# Patient Record
Sex: MALE | Race: WHITE | NOT HISPANIC OR LATINO | URBAN - METROPOLITAN AREA
[De-identification: names, ages, dates, MRNs, and addresses within clinical notes are randomized per-mention and may not be internally consistent; named-entity substitution may affect disease eponyms.]

---

## 2017-06-13 PROBLEM — Z00.00 ENCOUNTER FOR PREVENTIVE HEALTH EXAMINATION: Status: ACTIVE | Noted: 2017-06-13

## 2021-08-25 ENCOUNTER — EMERGENCY (EMERGENCY)
Facility: HOSPITAL | Age: 61
LOS: 1 days | Discharge: HOME | End: 2021-08-25
Attending: EMERGENCY MEDICINE | Admitting: EMERGENCY MEDICINE
Payer: COMMERCIAL

## 2021-08-25 VITALS
OXYGEN SATURATION: 100 % | HEART RATE: 59 BPM | RESPIRATION RATE: 18 BRPM | SYSTOLIC BLOOD PRESSURE: 168 MMHG | TEMPERATURE: 98 F | DIASTOLIC BLOOD PRESSURE: 80 MMHG

## 2021-08-25 DIAGNOSIS — F33.9 MAJOR DEPRESSIVE DISORDER, RECURRENT, UNSPECIFIED: ICD-10-CM

## 2021-08-25 LAB
ALBUMIN SERPL ELPH-MCNC: 4.9 G/DL — SIGNIFICANT CHANGE UP (ref 3.3–5)
ALP SERPL-CCNC: 96 U/L — SIGNIFICANT CHANGE UP (ref 40–120)
ALT FLD-CCNC: 24 U/L — SIGNIFICANT CHANGE UP (ref 4–41)
AMPHET UR-MCNC: NEGATIVE — SIGNIFICANT CHANGE UP
ANION GAP SERPL CALC-SCNC: 14 MMOL/L — SIGNIFICANT CHANGE UP (ref 7–14)
APAP SERPL-MCNC: <15 UG/ML — SIGNIFICANT CHANGE UP (ref 15–25)
APPEARANCE UR: CLEAR — SIGNIFICANT CHANGE UP
AST SERPL-CCNC: 18 U/L — SIGNIFICANT CHANGE UP (ref 4–40)
BARBITURATES UR SCN-MCNC: NEGATIVE — SIGNIFICANT CHANGE UP
BASOPHILS # BLD AUTO: 0.03 K/UL — SIGNIFICANT CHANGE UP (ref 0–0.2)
BASOPHILS NFR BLD AUTO: 0.4 % — SIGNIFICANT CHANGE UP (ref 0–2)
BENZODIAZ UR-MCNC: NEGATIVE — SIGNIFICANT CHANGE UP
BILIRUB SERPL-MCNC: 0.5 MG/DL — SIGNIFICANT CHANGE UP (ref 0.2–1.2)
BILIRUB UR-MCNC: NEGATIVE — SIGNIFICANT CHANGE UP
BUN SERPL-MCNC: 18 MG/DL — SIGNIFICANT CHANGE UP (ref 7–23)
CALCIUM SERPL-MCNC: 9.9 MG/DL — SIGNIFICANT CHANGE UP (ref 8.4–10.5)
CHLORIDE SERPL-SCNC: 103 MMOL/L — SIGNIFICANT CHANGE UP (ref 98–107)
CO2 SERPL-SCNC: 23 MMOL/L — SIGNIFICANT CHANGE UP (ref 22–31)
COCAINE METAB.OTHER UR-MCNC: NEGATIVE — SIGNIFICANT CHANGE UP
COLOR SPEC: SIGNIFICANT CHANGE UP
COVID-19 SPIKE DOMAIN AB INTERP: POSITIVE
COVID-19 SPIKE DOMAIN ANTIBODY RESULT: >250 U/ML — HIGH
CREAT SERPL-MCNC: 0.91 MG/DL — SIGNIFICANT CHANGE UP (ref 0.5–1.3)
CREATININE URINE RESULT, DAU: 107 MG/DL — SIGNIFICANT CHANGE UP
DIFF PNL FLD: NEGATIVE — SIGNIFICANT CHANGE UP
EOSINOPHIL # BLD AUTO: 0.09 K/UL — SIGNIFICANT CHANGE UP (ref 0–0.5)
EOSINOPHIL NFR BLD AUTO: 1.2 % — SIGNIFICANT CHANGE UP (ref 0–6)
ETHANOL SERPL-MCNC: <10 MG/DL — SIGNIFICANT CHANGE UP
GLUCOSE SERPL-MCNC: 86 MG/DL — SIGNIFICANT CHANGE UP (ref 70–99)
GLUCOSE UR QL: NEGATIVE — SIGNIFICANT CHANGE UP
HCT VFR BLD CALC: 44 % — SIGNIFICANT CHANGE UP (ref 39–50)
HGB BLD-MCNC: 14.5 G/DL — SIGNIFICANT CHANGE UP (ref 13–17)
IANC: 4.98 K/UL — SIGNIFICANT CHANGE UP (ref 1.5–8.5)
IMM GRANULOCYTES NFR BLD AUTO: 0.3 % — SIGNIFICANT CHANGE UP (ref 0–1.5)
KETONES UR-MCNC: NEGATIVE — SIGNIFICANT CHANGE UP
LEUKOCYTE ESTERASE UR-ACNC: NEGATIVE — SIGNIFICANT CHANGE UP
LYMPHOCYTES # BLD AUTO: 2.05 K/UL — SIGNIFICANT CHANGE UP (ref 1–3.3)
LYMPHOCYTES # BLD AUTO: 26.8 % — SIGNIFICANT CHANGE UP (ref 13–44)
MCHC RBC-ENTMCNC: 27.8 PG — SIGNIFICANT CHANGE UP (ref 27–34)
MCHC RBC-ENTMCNC: 33 GM/DL — SIGNIFICANT CHANGE UP (ref 32–36)
MCV RBC AUTO: 84.5 FL — SIGNIFICANT CHANGE UP (ref 80–100)
METHADONE UR-MCNC: NEGATIVE — SIGNIFICANT CHANGE UP
MONOCYTES # BLD AUTO: 0.49 K/UL — SIGNIFICANT CHANGE UP (ref 0–0.9)
MONOCYTES NFR BLD AUTO: 6.4 % — SIGNIFICANT CHANGE UP (ref 2–14)
NEUTROPHILS # BLD AUTO: 4.98 K/UL — SIGNIFICANT CHANGE UP (ref 1.8–7.4)
NEUTROPHILS NFR BLD AUTO: 64.9 % — SIGNIFICANT CHANGE UP (ref 43–77)
NITRITE UR-MCNC: NEGATIVE — SIGNIFICANT CHANGE UP
NRBC # BLD: 0 /100 WBCS — SIGNIFICANT CHANGE UP
NRBC # FLD: 0 K/UL — SIGNIFICANT CHANGE UP
OPIATES UR-MCNC: NEGATIVE — SIGNIFICANT CHANGE UP
OXYCODONE UR-MCNC: NEGATIVE — SIGNIFICANT CHANGE UP
PCP SPEC-MCNC: SIGNIFICANT CHANGE UP
PCP UR-MCNC: NEGATIVE — SIGNIFICANT CHANGE UP
PH UR: 6 — SIGNIFICANT CHANGE UP (ref 5–8)
PLATELET # BLD AUTO: 330 K/UL — SIGNIFICANT CHANGE UP (ref 150–400)
POTASSIUM SERPL-MCNC: 4.1 MMOL/L — SIGNIFICANT CHANGE UP (ref 3.5–5.3)
POTASSIUM SERPL-SCNC: 4.1 MMOL/L — SIGNIFICANT CHANGE UP (ref 3.5–5.3)
PROT SERPL-MCNC: 7.8 G/DL — SIGNIFICANT CHANGE UP (ref 6–8.3)
PROT UR-MCNC: NEGATIVE — SIGNIFICANT CHANGE UP
RBC # BLD: 5.21 M/UL — SIGNIFICANT CHANGE UP (ref 4.2–5.8)
RBC # FLD: 12.8 % — SIGNIFICANT CHANGE UP (ref 10.3–14.5)
SALICYLATES SERPL-MCNC: <5 MG/DL — LOW (ref 15–30)
SARS-COV-2 IGG+IGM SERPL QL IA: >250 U/ML — HIGH
SARS-COV-2 IGG+IGM SERPL QL IA: POSITIVE
SARS-COV-2 RNA SPEC QL NAA+PROBE: SIGNIFICANT CHANGE UP
SODIUM SERPL-SCNC: 140 MMOL/L — SIGNIFICANT CHANGE UP (ref 135–145)
SP GR SPEC: 1.01 — SIGNIFICANT CHANGE UP (ref 1.01–1.02)
THC UR QL: NEGATIVE — SIGNIFICANT CHANGE UP
TSH SERPL-MCNC: 1.27 UIU/ML — SIGNIFICANT CHANGE UP (ref 0.27–4.2)
UROBILINOGEN FLD QL: SIGNIFICANT CHANGE UP
WBC # BLD: 7.66 K/UL — SIGNIFICANT CHANGE UP (ref 3.8–10.5)
WBC # FLD AUTO: 7.66 K/UL — SIGNIFICANT CHANGE UP (ref 3.8–10.5)

## 2021-08-25 PROCEDURE — 93010 ELECTROCARDIOGRAM REPORT: CPT

## 2021-08-25 PROCEDURE — 90792 PSYCH DIAG EVAL W/MED SRVCS: CPT

## 2021-08-25 PROCEDURE — 99285 EMERGENCY DEPT VISIT HI MDM: CPT | Mod: 25

## 2021-08-25 RX ORDER — HYDROXYZINE HCL 10 MG
50 TABLET ORAL ONCE
Refills: 0 | Status: COMPLETED | OUTPATIENT
Start: 2021-08-25 | End: 2021-08-25

## 2021-08-25 RX ADMIN — Medication 50 MILLIGRAM(S): at 16:27

## 2021-08-25 NOTE — ED BEHAVIORAL HEALTH ASSESSMENT NOTE - HPI (INCLUDE ILLNESS QUALITY, SEVERITY, DURATION, TIMING, CONTEXT, MODIFYING FACTORS, ASSOCIATED SIGNS AND SYMPTOMS)
Pt. is a 61 y.o. 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    Patient brought to hospital by wife per her request after waking up this morning and thinking "I just can't do this anymore". He states that he has been feeling increasingly paranoid and states "I'm afraid something bad will happen. When asked about precipitating events, patient unable to identify a specific event that caused him to come to the hospital, rather citing a week of accumulating paranoia and inability to go to work for a week. Patient believes that his symptoms are related to stopping his Adderall. Patient was prescribed Adderall 5 weeks ago by a psychiatrist on a website called [facility], stating that the psychiatrist (Ander Miller) felt he med criteria for ADHD. After 1 week on Adderall, patient started to feel "spacy" and continued Adderall for an additional 2 weeks, stopping the Adderall 2 weeks prior to present visit. Patient states that paranoia has increased since stopping the medication and he feels that something bad will happen. He has not been able to leave the house or go to work for a week. Patient also endorses a depressed mood, irritability, "fitful" sleep, decreased interest in enjoyed activities (seeing people at AA meetings), feelings of guilt, decreased energy, impaired concentration, and decreased appetite for the past 2 weeks. Patient believes this, along with paranoia, is related to stopping the Adderall. Patient denies active SIIP/HIIP or history of SIIP/HIIP.    In terms of psychotic symptoms, patient reports subjective feeling of paranoia but denies feeling that anyone is out to get him. He denies delusions of thought broadcasting or reference. Patient denies A/V hallucinations.    In terms of manic symptoms, patient denies elevated mood, impulsive decisions, feeling "on top of the world", racing thoughts, increased goal-directed activity, decreased need for sleep, or excess talkativeness. Pt. is a 61 y.o. 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    Patient brought to hospital by wife per her request after waking up this morning and thinking "I just can't do this anymore". He states that he has been feeling increasingly paranoid and states "I'm afraid something bad will happen. When asked about precipitating events, patient unable to identify a specific event that caused him to come to the hospital, rather citing a week of accumulating paranoia and inability to go to work for a week. Patient believes that his symptoms are related to stopping his Adderall. Patient was prescribed Adderall 5 weeks ago by a psychiatrist on a website called [facility], stating that the psychiatrist (Ander Miller) felt he med criteria for ADHD. After 1 week on Adderall, patient started to feel "spacy" and continued Adderall for an additional 2 weeks, stopping the Adderall 2 weeks prior to present visit. Patient states that paranoia has increased since stopping the medication and he feels that something bad will happen. He has not been able to leave the house or go to work for a week. Patient also endorses a depressed mood, irritability, "fitful" sleep, decreased interest in enjoyed activities (seeing people at AA meetings), feelings of guilt, decreased energy, impaired concentration, and decreased appetite for the past 2 weeks. Patient believes this, along with paranoia, is related to stopping the Adderall. Patient denies active SIIP/HIIP or history of SIIP/HIIP.    In terms of psychotic symptoms, patient reports subjective feeling of paranoia but denies feeling that anyone is out to get him. He denies delusions of thought broadcasting or reference. Patient denies A/V hallucinations.    In terms of manic symptoms, patient denies elevated mood, impulsive decisions, feeling "on top of the world", racing thoughts, increased goal-directed activity, decreased need for sleep, or excess talkativeness.    Collateral obtained from patient's wife Pt. is a 61 y.o. 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    Patient brought to hospital by wife per her request after waking up this morning and thinking "I just can't do this anymore". He states "I'm afraid something bad will happen. When asked about precipitating events, patient unable to identify a specific event that caused him to come to the hospital, rather citing a week of accumulating anxiety and inability to go to work for a week. Patient believes that his symptoms are related to stopping his Adderall. Patient was prescribed Adderall 5 weeks ago by a psychiatrist on a website called [employer], stating that the psychiatrist (Adner Miller) felt he med criteria for ADHD. After 1 week on Adderall, patient started to feel "spacy" and continued Adderall for an additional 2 weeks, stopping the Adderall 2 weeks prior to present visit. Patient states that fear that something bad will happen has increased since stopping the medication and he feels that something bad will happen. He has not been able to leave the house or go to work for a week. Patient also endorses a depressed mood, irritability, "fitful" sleep, decreased interest in enjoyed activities (seeing people at AA meetings), feelings of guilt, decreased energy, impaired concentration, and decreased appetite for the past 2 weeks. Patient believes this is related to stopping the Adderall. Patient also endorses general feelings of anxiety, irritability, feelings of restlessness, and muscle tension. Patient denies active SIIP/HIIP or history of SIIP/HIIP.    In terms of psychotic symptoms, patient reports subjective feeling of paranoia but denies feeling that anyone is out to get him. He denies delusions of thought broadcasting or reference. Patient denies A/V hallucinations.    In terms of manic symptoms, patient denies elevated mood, impulsive decisions, feeling "on top of the world", racing thoughts, increased goal-directed activity, decreased need for sleep, or excess talkativeness.    Collateral obtained from patient's wife disclosed that patient has been unable to go to work for the past week. She states that he has been staring off into space and refusing to eat. She believes it was triggered by the family dog getting very ill, but is also concerned that the underlying cause of all of the symptoms is related to his Adderall prescription. Of note, wife states that patient gets sad around September every year, as he went through a custody castano for his daughter in September 2004. Though he won custody, his daughter chose to go live with her mother. His daughter was supposed to visit a month ago, but suddenly cancelled. He has not seen her in four years. Pt. is a 61 y.o. 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    Patient brought to hospital by wife per her request after waking up this morning and thinking "I just can't do this anymore". He states "I'm afraid something bad will happen." When asked about precipitating events, patient unable to identify a specific event that caused him to come to the hospital, rather citing a week of accumulating anxiety and inability to go to work for a week. Patient believes that his symptoms are related to stopping his Adderall 10mg XR. Patient was prescribed Adderall 5 weeks ago by a psychiatrist on a website called [employer], stating that the psychiatrist (Ander Miller) felt he met criteria for ADHD. After 1 week on Adderall, patient started to feel "spacy" and continued Adderall for an additional 2 weeks but ultimately decided to stopping the Adderall 2 weeks prior to present visit. Patient states that he fears that something bad will happen if he leaves the house and he has not gone to work for a week. When asked what that something is "he states that he feels he won't be able to do his job" and denies any associated paranoia (someone will hurt him etc.). . Patient also endorses a depressed mood, irritability, "fitful" sleep, decreased interest in enjoyed activities (seeing people at [employer] meetings), feelings of guilt, decreased energy, impaired concentration, and decreased appetite for the past 2 weeks and patient feels that this is since stopping the Adderall. Patient also endorses general feelings of anxiety, irritability, feelings of restlessness, and muscle tension but denies any panic attacks. Patient denies active SIIP/HIIP or history of SIIP/HIIP. Patient states he's been taking Zoloft 150mg po daily consistently and had been on Abilify but his "[facility]" Doctor increased the Abilify from 5mg to 10mg and states that since that happened he reported higher anxiety so he discontinued the Abilify.    In terms of psychotic symptoms, patient reports subjective feeling of paranoia but denies feeling that anyone is out to get him. He denies delusions of thought broadcasting or reference. Patient denies A/V hallucinations.    In terms of manic symptoms, patient denies elevated mood, impulsive decisions, feeling "on top of the world", racing thoughts, increased goal-directed activity, decreased need for sleep, or excess talkativeness.    Collateral obtained from patient's wife disclosed that patient has been unable to go to work for the past week. She states that he has been staring off into space and refusing to eat. She believes it was triggered by the family dog getting very ill, but is also concerned that the underlying cause of all of the symptoms is related to his Adderall prescription. Of note, wife states that patient gets sad around September every year, as he went through a custody castano for his daughter in September 2004. Though he won custody, his daughter chose to go live with her mother. His daughter was supposed to visit a month ago, but suddenly cancelled. He has not seen her in four years.

## 2021-08-25 NOTE — ED BEHAVIORAL HEALTH ASSESSMENT NOTE - CASE SUMMARY
I have personally seen and examined this patient. I have fully participated in the care of this patient. I have made amendments to the documentation where appropriate and otherwise agree with the history, physical exam, and plan as documented Yovanny Bang.   Patient is presenting with high anxiety that has impaired his sleep and appetite (labs and vitals are stable) with associated depression (feeling hopeless, has not been motivated, unable to go to work) and reporting more fear of failure. At this time he is adamantly denying any SI/I/P, able to engage in safety planning and collateral does not report acute safety concerns. Patient has had multiple psychotropic medication changes including d/c of Adderall XR 10mg over the last two weeks, recent increase of ABilify 10mg po with Zoloft 150mg po. Patient seeking medication adjustments and feels hopeful that [facility] will be able to help him establish stable outpatient care. At this time, patient is declining voluntary psych hospitalization and does not meet criteria for involuntary psych hospitalization.

## 2021-08-25 NOTE — ED PROVIDER NOTE - PROGRESS NOTE DETAILS
Patient received in signout from Dr Chaparro  60 y/o male who PTED with a feeling that he " just can't do this anymore". He states that he has been feeling increasingly paranoia that  " something bad will happen" over the past week temporized to his d/cing his Adderal he obtained via telepsych ( a website called [employer]) to treat supposed ADHD s/s/. He  is feeling depressed and has disturbed sleep, guilt, decreased energy, impaired concentration, and decreased appetite. Symptoms are affecting his ADL's; he cant go to work  VS  T(F): 98.2 HR: 59 BP: 168/80 RR: 18 SpO2: 100%   PE: as described; my additions and exceptions are noted in the chart    DATA:    LAB:                       14.5   7.66  )-----------( 330      ( 25 Aug 2021 15:21 )             44.0   Mean Cell Volume: 84.5 fL (21 @ 15:21) Auto Neutrophil %: 64.9 % (21 @ 15:21) Auto Eosinophil %: 1.2 % (21 @ 15:21)     Urinalysis Basic - ( 25 Aug 2021 15:21 ) Color: Light Yellow / Appearance: Clear / S.013 / pH: x Gluc: x / Ketone: Negative  / Bili: Negative / Urobili: <2 mg/dL  Blood: x / Protein: Negative / Nitrite: Negative  Leuk Esterase: Negative / RBC: x / WBC x  Sq Epi: x / Non Sq Epi: x / Bacteria: x    IMPRESSION/RISK:  Dx=Depression and paranoia  Consideration include  Plan  Patient to be seen by psych  labs wnl  will follow reccs  medically cleared if admission wished/recommended

## 2021-08-25 NOTE — ED BEHAVIORAL HEALTH ASSESSMENT NOTE - NSHISTORFACTOR_PSY_ALL_CORE
Patient underwent divorce and custody castano in 2004 which he reports was very traumatic for him./History of abuse/trauma

## 2021-08-25 NOTE — ED BEHAVIORAL HEALTH ASSESSMENT NOTE - OTHER PAST PSYCHIATRIC HISTORY (INCLUDE DETAILS REGARDING ONSET, COURSE OF ILLNESS, INPATIENT/OUTPATIENT TREATMENT)
History of MDD since 2004 after custody castano for daughter, at this time he started taking Zoloft 150 mg. Stopped taking Zoloft in 2013, when he reported that he began abusing opioids that he received for pain after a hip replacement. Stopped abusing opioids in April 2019 and restarted Zoloft, which he has been compliant with since. Also reported abusing alcohol from March - April 2019 in an attempt to "taper off the opioids." Started taking Abilify 5 mg 2 months ago due to return of depressive symptoms, stated that he felt great for about a week but then depressive symptoms returned after 1 week. Dose was then increased to 10 mg with no resolution of symptoms. Abilify stopped after 3 weeks of use due to no improvement of symptoms. Patient prescribed Adderall 10 mg due to suspected ADHD symptoms by a psychiatrist that he sees [facility]. Felt more focused for one week, but then reported that he felt spacy ands stopped taking it 2 weeks ago. No prior psychiatric hospitalizations.

## 2021-08-25 NOTE — ED PROVIDER NOTE - OBJECTIVE STATEMENT
61 yr old male with hx of anxiety and depression presents for passive SI/worsening depression.  States months ago needed additional assistance as the sertraline he was taking was not working as well as it should (anhedonia, insomnia).  Used program called [facility] for therapist and rx's.  Went through abilify and adderol with minimal success and many side effects (Delusions, etc).  Presents today with wife with worsening depression, not getting out of bed and passive SI.  Denies any medical complaints.

## 2021-08-25 NOTE — ED BEHAVIORAL HEALTH ASSESSMENT NOTE - COMMENTS ON VIOLENCE RISK/PROTECTIVE FACTORS:
Takes Zoloft as prescribed. Stable home and stable relationships with wife, stepdaughter, and son. History of alcohol and opioid abuse but sober since April 2019. Reports job stability and sees a therapist and psychiatrist on [facility] regularly.

## 2021-08-25 NOTE — ED BEHAVIORAL HEALTH ASSESSMENT NOTE - NSPRESENTSXS_PSY_ALL_CORE
Patient reports with depressed mood for two weeks and states that he feels "hopeless." He states that his sleep has been "fitful."/Depressed mood/Anhedonia/Hopelessness or despair/Global insomnia

## 2021-08-25 NOTE — ED BEHAVIORAL HEALTH ASSESSMENT NOTE - DETAILS
Patient stated yesterday to his wife "I just can't do this anymore." Patient has not endorsed passive or active SIIP. Completed with patient by medical student Jonel Bang Family aware of plan

## 2021-08-25 NOTE — ED ADULT NURSE NOTE - CHIEF COMPLAINT QUOTE
Continue to complete antibiotics, continue with pain management, and continue with oral hydration even if the appetite is not good. If the child is able to hydrate then this probably is running its course. As it stands it can wait until Monday.   If the s pt with co feeling afraid and paranoid over last week. Pt is on zoloft and was started on Abilify 5mg. pt states did well on it for about one week then it was increased and  he became irritable. Pt currently not on abilify.  Pt denies feeling /Si. denies visual and auditory hallucinations.

## 2021-08-25 NOTE — ED BEHAVIORAL HEALTH ASSESSMENT NOTE - NSACTIVEVENT_PSY_ALL_CORE
Patient and wife believe that symptoms may be triggered or exacerbated by dog becoming very sick one week ago. Wife also states that custody castano for daughter occurred in September 2004, and patient experiences depressive symptoms around this time every year./Triggering events leading to humiliation, shame, and/or despair (e.g., Loss of relationship, financial or health status) (real or anticipated)

## 2021-08-25 NOTE — ED PROVIDER NOTE - PATIENT PORTAL LINK FT
You can access the FollowMyHealth Patient Portal offered by [facility] by registering at the following website: [email or web address]. By joining [facility]’s FollowMyHealth portal, you will also be able to view your health information using other applications (apps) compatible with our system.

## 2021-08-25 NOTE — ED BEHAVIORAL HEALTH ASSESSMENT NOTE - NSSUICPROTFACT_PSY_ALL_CORE
Patient reports having a very supportive wife, stepdaughter, and son. He has a therapist and psychiatrist [facility]. 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 He has 2 dogs./Responsibility to children, family, or others/Identifies reasons for living/Supportive social network of family or friends/Engaged in work or school/Positive therapeutic relationships/Beloved pets

## 2021-08-25 NOTE — ED ADULT NURSE NOTE - OBJECTIVE STATEMENT
PT coming in from home c/o feeling helpless, and fearful for his life. pt states only place he feels safe is at home. PT coming in from home c/o feeling helpless, and fearful for his life. pt states only place he feels safe is at home. pt states he feels depressed but does not have a plan to commit SI. pt has hx of depression and alcohol abuse and sees a telehealth who manages his zoloft meds. pt denies HI/AH/VH. labs sent.

## 2021-08-25 NOTE — ED PROVIDER NOTE - NSFOLLOWUPINSTRUCTIONS_ED_ALL_ED_FT
You have been given information necessary to follow up with the  [facility] ([facility]) Crisis center & other outpatient  psychiatric clinics within your community    • [facility] walk in Crisis centre  [address] [place] [address] [phone] [email or web address]  Hours of operation:  Day	                                        Hours  Sunday                                  Closed  Monday                                9am - 3pm  Tuesday                                9am - 3pm  Wednesday                          9am - 3pm  Thursday                               9am - 3pm  Friday                                    9am - 3pm  Saturday                                Closed    .....additionally if your current problem is associated with drug or alcohol abuse further information can be obtained at the Drug Abuse Evaluation Health Referral Servce ([facility])    • [facility] [address] [place] [address] [phone] [email or web address]-[email or web address]-referral-service    Additionally if more support and information and help is needed in the area of suicide prevention pleas3 feel free to contact :   • [place] [address]  Phone: 7-375-473-XUFM (3249)  Web Address: http://www.suicidepreventionlifeline.org  • Suicide Awareness Voices of Education  [address]  [place], [place]  Phone: 1-524.589.1744  Web Address: http://www.save.org    Depression    WHAT YOU NEED TO KNOW:  Depression is a medical condition that causes feelings of sadness or hopelessness that do not go away. Depression may cause you to lose interest in things you used to enjoy. These feelings may interfere with your daily life.  DISCHARGE INSTRUCTIONS:  Call your local emergency number (911 in the [place]) if:   •You think about harming yourself or someone else.  •You have done something on purpose to hurt yourself.  Call your therapist or doctor if:   •Your symptoms do not improve.  •You cannot make it to your next appointment.   •You have new symptoms.  •You have questions or concerns about your condition or care.  The following resources are available at any time to help you, if needed:   •National Suicide Prevention Lifeline: 1-706.352.2079 (9-870-441-TALK)  •Suicide Hotline: 1-198.443.6413 (9-786-VFKXWSE)  •For a list of international numbers: https://save.org/find-help/international-resources/  Medicines:   •Antidepressants may be given to improve or balance your mood. You may need to take this medicine for several weeks before you begin to feel better.  •Take your medicine as directed. Contact your healthcare provider if you think your medicine is not helping or if you have side effects. Tell him of her if you are allergic to any medicine. Keep a list of the medicines, vitamins, and herbs you take. Include the amounts, and when and why you take them. Bring the list or the pill bottles to follow-up visits. Carry your medicine list with you in case of an emergency.  Therapy is often used together with medicine to relieve depression. Therapy is a way for you to talk about your feelings and anything that may be causing depression. Therapy can be done alone or in a group. It may also be done with family members or a significant other.    Self-care:   •Get regular physical activity. Try to be active for 30 minutes, 3 to 5 days a week. Physical activity can help relieve depression. Work with your healthcare provider to develop a plan that you enjoy. It may help to ask someone to be active with you.  •Create a regular sleep schedule. A routine can help you relax before bed. Listen to music, read, or do yoga. Try to go to bed and wake up at the same time every day. Sleep is important for emotional health.  •Eat a variety of healthy foods. Healthy foods include fruits, vegetables, whole-grain breads, low-fat dairy products, lean meats, fish, and cooked beans. A healthy meal plan is low in fat, salt, and added sugar.  •Do not drink alcohol or use drugs. Alcohol and drugs can make depression worse. Talk to your therapist or doctor if you need help quitting.  Follow up with your healthcare provider as directed: Your healthcare provider will monitor your progress at follow-up visits. He or she will also monitor your medicine if you take antidepressants. Your healthcare provider will ask if the medicine is helping. Tell him or her about any side effects or problems you may have with your medicine. The type or amount of medicine may need to be changed. Write down your questions so you remember to ask them during your visits.    Suicide Prevention  WHAT YOU NEED TO KNOW:  You may see suicide as the only way to escape emotional or physical pain and suffering. Help is available from people who care about you, and from professionals trained in suicide prevention. Prevention includes everything you and others can do to stop you from taking your life.  DISCHARGE INSTRUCTIONS:  Call your local emergency number (911 in the ), or ask someone to call if:   •You do something on purpose to hurt yourself  •You make a plan to commit suicide.  Call your doctor or therapist, or have someone close to you call if:   •You act out in anger, are reckless, or are abusing alcohol or drugs.  •You have serious thoughts of suicide, even with treatment.  •You have more thoughts of suicide when you are alone.  •You stop eating, or you begin to smoke cigarettes or drink alcohol.  •You have questions or concerns about your condition or care.  What to do if you are considering suicide:    •Contact a suicide prevention organization. The following are always available to help you: ?National Suicide Prevention Lifeline: 1-193.460.6024 (4-009-379-TALK)  ?Suicide Hotline: 1-123.934.8163 (6-682-UOHUYPT)  ?For a list of international numbers: https://save.org/find-help/international-resources/  •Contact your therapist. Your doctor can give you a list of therapists if you do not have one.  •Keep medicines, weapons, and alcohol out of your home.  •Do not spend time alone if you have thoughts of ending your life.  Warning signs of suicide: The following can help you and others recognize that you are struggling:   •Talking about your plan for committing suicide, or wanting to read or write about death or suicide  •Cutting yourself, burning your skin with cigarettes, or driving recklessly  •Drug or alcohol use, not taking your prescribed medicine, or taking too much  •Not wanting to spend time with others or doing things you enjoy, feeling bored, or not wanting anyone to praise you  •Changes in your appetite, sleep habits, energy levels, or weight  •Feeling angry, or lashing out at others  •A need to give away or throw away your belonging  •Often skipping work  •Suddenly not taking medicine for a mental illness without talking to your healthcare provider  •Suddenly not going to therapy  Treatment for suicidal thoughts:   •Medicines may be given to prevent mood swings, or to decrease anxiety or depression. You will need to take all medicines as directed. A sudden stop can be harmful. It may take 4 to 6 weeks for the medicine to help you feel better.  •Suicide risk assessment means healthcare providers will ask questions about your suicide thoughts and plans. They will ask how often you think about suicide and if you have tried it before. They will ask if you have begun to hurt yourself, such as with cutting or reckless driving. They may ask if you have access to weapons or drugs.  •A safety plan includes a list of people or groups to contact if you have suicidal feelings again. The list may include friends, family members, a spiritual leader, and others you trust. You may be asked to make a verbal agreement or sign a contract that you will not try to harm yourself.  •A therapist can help you identify and change negative feelings or beliefs about yourself. This may help change the way you feel and act. A therapist can also help you find ways to cope with things that cannot be changed.  Manage depression:   •Get help for drug or alcohol abuse. Drugs and alcohol can make suicidal feelings worse and make you more likely to act on them. Drugs and alcohol can also cause or increase depression.  •Talk to someone you trust. Be honest about your thoughts and feelings about suicide. You can call a suicide prevention center if you do not want to talk to someone you know.  •Exercise as directed. Exercise can lift your mood, give you more energy, and make it easier to sleep.   •Eat a variety of healthy foods. Healthy foods include fruits, vegetables, whole-grain breads, lean meats, fish, low-fat dairy products, and beans. Try to eat regularly even if you do not feel hungry. Depression can increase from a lack of nutrition or if you are hungry for long periods of time.  •Create a sleep routine. Try to go to bed and wake up at the same time every day. Let your healthcare provider know if you are having trouble sleeping.  •Take your medicine and go to therapy as directed. Medicine and therapy can help you manage your mental health. Do not stop taking your medicine without talking to your healthcare provider. If you do not like the way a medicine makes you feel, you may be able to try a different medicine.    **Follow up with your doctor or therapist as directed: Write down your questions so you remember to ask them during your visits.**    Se le ha proporcionado la información necesaria para realizar un seguimiento con el centro de crisis del [facility] ([facility]) y otras clínicas psiquiátricas ambulatorias dentro de [facility] comunidad.    • [facility] camina en el centro de crisis [address] [place] [address] [phone] [email or web address]/[email or web address]-health-crisis-center  Horas de operación:  Horas del día  Marquez cerrado  Lunes 9 am - 3 pm  Elmer 9am - 3pm  Miércoles 9 am - 3 pm  Jueves 9 am - 3 pm  Viernes 9am - 3pm  Sábado cerrado    ..... además, si [facility] problema actual está asociado con el abuso de drogas o alcohol, se puede obtener más información en el Servicio de Referencia de [facility] de Evaluación de Abuso de Drogas ([facility])    • [facility] [address] [place] [address] [phone] [email or web address]/[email or web address]-evaluation-health-referral-service    Además, si se necesita más apoyo, información y ayuda en el área de la prevención del suicidio, por favor, no dude en comunicarse con:  • Línea directa para la prevención del [place] [address]  Teléfono: [phone] [phone]  Dirección web: http://www.suicidepreventionlifeline.org  • Voces de educación de concienciación sobre el suicidio  [address]  [place], [place] [address]  Teléfono: [phone]  Dirección web: http://www.save.org

## 2021-08-25 NOTE — ED BEHAVIORAL HEALTH ASSESSMENT NOTE - SUMMARY
Pt. is a 61 y.o. 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    Patient is presenting with abnormal behavior in the context of multiple stressors (illness of dog one week ago, sudden visit cancellation from daughter) and recent medication changes. He was prescribed Adderall 10 mg 5 weeks ago for suspected ADHD, but stopped due to feelings of "spaciness". Since that time, he has developed fear that something bad will happen with inability to go to work for the past week. Patient also endorses a depressed mood, irritability, "fitful" sleep, decreased interest in enjoyed activities (seeing people at AA meetings), feelings of guilt, decreased energy, impaired concentration, and decreased appetite for the past 2 weeks. Patient also endorses general feelings of anxiety, irritability, feelings of restlessness, and muscle tension. Patient is exhibiting signs of major depressive disorder, making MDD with anxiety the most likely diagnosis. Pt. is a 61 y.o. 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    Patient is presenting with high anxiety in the context of multiple stressors (illness of dog one week ago, sudden visit cancellation from daughter) and recent medication changes. He was prescribed Adderall XR 10 mg 5 weeks ago for suspected ADHD, but stopped due to feelings of "spaciness". Since that time, he has developed fear that something bad will happen with inability to go to work for the past week. Patient also endorses a depressed mood, irritability, "fitful" sleep, decreased interest in enjoyed activities (seeing people at AA meetings), feelings of guilt, decreased energy, impaired concentration, and decreased appetite for the past 2 weeks. Patient also endorses general feelings of anxiety, irritability, feelings of restlessness, and muscle tension.   At this time patient is hopeful and motivated for treatment and willing to cross titrate medication to improve anxiety and depression. He is adamantly denying any SI/I/P, able to engage in safety planning and wants to follow up as an outpatient. Discussed plans with wife who has no acute safety concerns and will ensure patient follows up.

## 2021-08-25 NOTE — ED ADULT TRIAGE NOTE - CHIEF COMPLAINT QUOTE
pt with co feeling afraid and paranoid over last week. Pt is on zoloft and was started on Abilify 5mg. pt states did well on it for about one week then it was increased and  he became irritable. Pt currently not on abilify.  Pt denies feeling /Si. denies visual and auditory hallucinations.

## 2021-08-25 NOTE — ED BEHAVIORAL HEALTH ASSESSMENT NOTE - NSTXRELFACTOR_PSY_ALL_CORE
Patient was prescribed Adderall 5 weeks ago by a psychiatrist from [facility] (see HPI), discontinued 2 weeks ago when patient started to feel too "spacy." Sympotms have worsened since stopping Adderall./Change in provider or treatment (i.e., medications, psychotherapy, milieu)

## 2021-08-25 NOTE — ED BEHAVIORAL HEALTH ASSESSMENT NOTE - VIOLENCE RISK FACTORS:
Feeling of being under threat and being unable to control threat/History of being victimized/traumatized/Irritability

## 2021-08-25 NOTE — ED BEHAVIORAL HEALTH ASSESSMENT NOTE - NSCURPASTPSYDX_PSY_ALL_CORE
Patient has history of opioid and alcohol abuse, MDD, and possible ADHD./Mood disorder/ADHD/Alcohol/Substance Use disorders

## 2021-08-25 NOTE — ED BEHAVIORAL HEALTH ASSESSMENT NOTE - DESCRIPTION
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 Bilateral hip surgery in 2013 Patient calm and cooperative with interview. No PRNs needed.    Vital Signs Last 24 Hrs  T(C): 36.8 (25 Aug 2021 13:23), Max: 36.8 (25 Aug 2021 13:23)  T(F): 98.2 (25 Aug 2021 13:23), Max: 98.2 (25 Aug 2021 13:23)  HR: 59 (25 Aug 2021 13:23) (59 - 59)  BP: 168/80 (25 Aug 2021 13:23) (168/80 - 168/80)  BP(mean): --  RR: 18 (25 Aug 2021 13:23) (18 - 18)  SpO2: 100% (25 Aug 2021 13:23) (100% - 100%) Patient calm and cooperative with interview. Given Atarax 50mg po with positive effect    Vital Signs Last 24 Hrs  T(C): 36.8 (25 Aug 2021 13:23), Max: 36.8 (25 Aug 2021 13:23)  T(F): 98.2 (25 Aug 2021 13:23), Max: 98.2 (25 Aug 2021 13:23)  HR: 59 (25 Aug 2021 13:23) (59 - 59)  BP: 168/80 (25 Aug 2021 13:23) (168/80 - 168/80)  BP(mean): --  RR: 18 (25 Aug 2021 13:23) (18 - 18)  SpO2: 100% (25 Aug 2021 13:23) (100% - 100%)

## 2021-08-25 NOTE — ED BEHAVIORAL HEALTH ASSESSMENT NOTE - RISK ASSESSMENT
Risk factors include old age, male gender, history of depression, Risk factors include old age, male gender, history of depression, and sleep disturbances.    Protective factors include stabled residence, supportive wife and children, pets, no access to firearms, good insight into disease, some coping skills, employment, no active substance use, no medical illness, and Alcoholics Anonymous group. Low Acute Suicide Risk Risk factors include male gender, history of depression, and sleep disturbances.  Protective factors include stabled residence, supportive wife and children, pets, no access to firearms, good insight into disease, some coping skills, employment, no active substance use, no medical illness, and Alcoholics Anonymous group.

## 2021-08-26 ENCOUNTER — OUTPATIENT (OUTPATIENT)
Dept: OUTPATIENT SERVICES | Facility: HOSPITAL | Age: 61
LOS: 1 days | Discharge: STILL ADMITTED OR ADMINISTRATIVE | End: 2021-08-26
Payer: COMMERCIAL

## 2021-08-26 PROBLEM — F41.9 ANXIETY DISORDER, UNSPECIFIED: Chronic | Status: ACTIVE | Noted: 2021-08-25

## 2021-08-26 PROCEDURE — 99214 OFFICE O/P EST MOD 30 MIN: CPT

## 2021-08-26 NOTE — ED BEHAVIORAL HEALTH NOTE - BEHAVIORAL HEALTH NOTE
High Risk Log:  Writer called  spoke to patient who states he is on route to the Crisis Clinic.  Writer emailed Crisis Clinic to inform them of patient's visit.

## 2021-08-27 DIAGNOSIS — F32.9 MAJOR DEPRESSIVE DISORDER, SINGLE EPISODE, UNSPECIFIED: ICD-10-CM

## 2021-10-21 NOTE — ED BEHAVIORAL HEALTH ASSESSMENT NOTE - OTHER
Is This A New Presentation, Or A Follow-Up?: Skin Lesions
How Severe Is Your Skin Lesion?: mild
Have Your Skin Lesions Been Treated?: not been treated
Frequently averts gaze and stares off into distance.

## 2022-04-03 NOTE — ED ADULT TRIAGE NOTE - TEMPERATURE IN FAHRENHEIT (DEGREES F)
PULMONARY CONSULTATION    ADMISSION DATE:  3/28/2022  CONSULTING PHYSICIAN:  Naif Castillo MD  ATTENDING PHYSICIAN:  Tanvi Colon MD  Silvino Layton  [id number]  1953    CODE STATUS:  Full Resuscitation    SUBJECTIVE:  I was asked to see Silvino Layton at the request of Dr. Colon for evaluation of nocturnal hypoxemia.   Silvino Layton is a 68 year old female patient admitted with shortness of breath and dizziness after a recent placement of a watchman device at an outside hospital.  Patient was also complaining of vomiting.  Admitted with a working diagnosis of worsening renal insufficiency.    Treated for acute on chronic systolic congestive heart failure    Noted to have acute kidney injury superimposed on chronic kidney disease stage IV.    Noted to be on amiodarone due to history of atrial fibrillation.    Evaluated by cardiology due to history of nonischemic cardiomyopathy, left bundle branch block, status post biventricular ICD placement and paroxysmal atrial fibrillation on amiodarone with recurrent epistaxis therefore a poor candidate for long-term anticoagulation.    Patient's chest pain was thought to be due to pericarditis due to a friction rub noted on auscultation had been discharged previously on colchicine, which was discontinued.  Patient was started on prednisone.    Nephrology input noted acute kidney injury thought to be possibly due to cardiorenal syndrome with vomiting.  Patient also was noted to have hyponatremia and fluid overloaded.    Admitted to the intensive care unit and managed by critical care for acute hypoxic respiratory failure due to fluid overload initially treated with BiPAP and diuretics and taken off positive pressure ventilation quickly.    Also noted to have a history of epilepsy.  Neurology consulted and noted improvement in responsiveness with a reduction in Keppra dose, found to be in levetiracetam toxicosis.    Patient noted to be hypoxic at night.  Lowest saturation noted  to be 91% on supplemental oxygen.    Declined empiric CPAP.    Currently on supplemental oxygen via nasal prongs.    Pulmonary consultation sought.    HISTORIES:  Past Medical History:   Diagnosis Date   • Anemia    • Arrhythmia     ICD phillip Pineda checks   • Samuels's esophagus    • Chronic kidney disease     stage III not on dialysis   • Colon cancer (CMS/HCC) 2015    last chemo 2015   • Epilepsy (CMS/HCC)     last seizure 11/2021   • Essential hypertension, benign 7/19/2012   • History of blood transfusion 2018   • Malignant neoplasm (CMS/HCC)     colon last chemo 2016 no radiation    • PONV (postoperative nausea and vomiting)    • Seizure (CMS/HCC)     last seizure 12/2021   • Thyroid condition      Past Surgical History:   Procedure Laterality Date   • Cardiac defibrillator placement Left     Dr. Rudy shahid follows,    • Colon surgery      RESECTION   • Colonoscopy     • Colonoscopy     • Egd     • Nasal/sinus procedure  02/18/2022    vessel ablation for nose bleeds   • Pacemaker      [employer]     ALLERGIES:   Allergen Reactions   • Sucralfate SWELLING      Social History     Tobacco Use   • Smoking status: Never Smoker   • Smokeless tobacco: Never Used   Substance Use Topics   • Alcohol use: Never     No family history on file.  No history of early onset lung disease       Medications Prior to Admission   Medication Sig Dispense Refill   • AMIODarone (PACERONE) 200 MG tablet Take 200 mg by mouth daily.     • lamotrigine (LAMICTAL) 200 MG tablet Take 200 mg by mouth every morning.     • lamoTRIgine (LaMICtal) 100 MG tablet Take 300 mg by mouth every evening.     • levetiracetam (KEPPRA) 1000 MG tablet Take 2,000 mg by mouth 2 times daily.     • omeprazole (PriLOSEC) 40 MG capsule Take 40 mg by mouth 2 times daily.     • levothyroxine 150 MCG tablet Take 150 mcg by mouth every morning.      • carvedilol (COREG) 12.5 MG tablet Take 12.5 mg by mouth 2 times  daily (with meals).     • [DISCONTINUED] colchicine (COLCRYS) 0.6 MG tablet Take 0.6 mg by mouth 2 times daily. x14 days     • [DISCONTINUED] furosemide (LASIX) 80 MG tablet Take 80 mg by mouth 2 times daily.         REVIEW OF SYSTEMS:  Review of Systems   Constitutional: Negative.    HENT: Negative.    Eyes: Negative.    Respiratory: Positive for shortness of breath.    Cardiovascular: Negative.    Gastrointestinal: Negative.    Endocrine: Negative.    Genitourinary: Negative.    Musculoskeletal: Negative.    Skin: Negative.    Allergic/Immunologic: Negative.    Neurological: Negative.    Hematological: Negative.    Psychiatric/Behavioral: Negative.         SCHEDULED MEDS:    • sodium chloride  250 mL Intravenous Once   • levETIRAcetam  500 mg Oral 2 times per day   • predniSONE  10 mg Oral Daily with breakfast   • polyethylene glycol  17 g Oral Daily   • docusate sodium  100 mg Oral QHS   • heparin (porcine)  5,000 Units Subcutaneous 3 times per day   • torsemide  10 mg Oral Daily   • pantoprazole  40 mg Intravenous Nightly   • [Held by provider] melatonin  6 mg Oral Nightly   • AMIODarone  200 mg Oral Daily   • carvedilol  12.5 mg Oral BID WC   • lamoTRIgine  300 mg Oral Q Evening   • lamotrigine  200 mg Oral QAM   • levothyroxine  150 mcg Oral QAM   • sodium chloride (PF)  2 mL Intracatheter 2 times per day   • Potassium Standard Replacement Protocol   Does not apply See Admin Instructions   • Magnesium Standard Replacement Protocol   Does not apply See Admin Instructions   • Phosphorus Standard Replacement Protocol   Does not apply See Admin Instructions       PRN MEDS:  ondansetron (ZOFRAN) parenteral, metoCLOPramide (REGLAN) injection, albuterol, lidocaine 2% urethral, sodium chloride, sodium chloride, sodium chloride, prochlorperazine, acetaminophen, polyethylene glycol, docusate sodium-sennosides    OBJECTIVE:  VITAL SIGNS:  Vital Last Value 24 Hour Range   Temperature 98.2 °F (36.8 °C) (04/03/22 1045) Temp   Min: 97.5 °F (36.4 °C)  Max: 98.2 °F (36.8 °C)   Pulse 62 (04/03/22 1045) Pulse  Min: 56  Max: 67   Respiratory 18 (04/03/22 1045) Resp  Min: 18  Max: 18   Non-Invasive  Blood Pressure 130/73 (04/03/22 1045) BP  Min: 121/73  Max: 134/75   Pulse Oximetry 100 % (04/03/22 1045) SpO2  Min: 91 %  Max: 100 %     Vital Today Admitted   Weight 77.7 kg (171 lb 4.8 oz) (04/03/22 0440) Weight: 81.6 kg (180 lb) (03/28/22 1123)   Height N/A Height: 5' 3\" (160 cm) (03/28/22 1123)   BMI N/A BMI (Calculated): 31.89 (03/28/22 1123)       INTAKE/OUTPUT LAST 3 SHIFTS:  I/O last 3 completed shifts:  In: 360 [P.O.:360]  Out: 1440 [Urine:1200; Emesis:240]           PHYSICAL EXAMINATION:  Physical Exam  Vitals and nursing note reviewed.   Constitutional:       General: She is not in acute distress.     Appearance: She is well-developed.      Comments: Well-developed well-nourished heavyset female resting comfortably in chair   HENT:      Head: Normocephalic and atraumatic.      Mouth/Throat:      Pharynx: No oropharyngeal exudate.      Comments: Class IV airway, no retrognathia no macroglossia  Eyes:      General: No scleral icterus.     Conjunctiva/sclera: Conjunctivae normal.      Pupils: Pupils are equal, round, and reactive to light.   Neck:      Thyroid: No thyromegaly.      Vascular: No JVD.      Trachea: Trachea and phonation normal. No tracheal deviation.   Cardiovascular:      Rate and Rhythm: Normal rate and regular rhythm.      Heart sounds: Normal heart sounds. No murmur heard.  Pulmonary:      Effort: Pulmonary effort is normal. No respiratory distress.      Breath sounds: Normal breath sounds. No wheezing or rales.   Chest:      Chest wall: No tenderness.   Abdominal:      General: Bowel sounds are normal. There is no distension.      Palpations: Abdomen is soft.      Tenderness: There is no abdominal tenderness. There is no guarding.      Comments: Above the level of the thorax   Musculoskeletal:         General: No  tenderness or deformity. Normal range of motion.      Cervical back: Normal range of motion and neck supple.   Lymphadenopathy:      Cervical: No cervical adenopathy.   Skin:     General: Skin is warm and dry.      Findings: No erythema or rash.   Neurological:      Mental Status: She is alert and oriented to person, place, and time.      Cranial Nerves: No cranial nerve deficit.      Coordination: Coordination normal.   Psychiatric:         Behavior: Behavior normal. Behavior is cooperative.         Thought Content: Thought content normal.         Judgment: Judgment normal.          LABORATORY DATA:  Comprehensive metabolic panel is normal with exception of a potassium of 3.3    BUN/creatinine 46/2.54    White blood cell count 9000    Hemoglobin/hematocrit 9.3/29.5    Iron studies consistent with iron deficiency    Surface twelve-lead ECG with paced rhythm    2D echocardiogram reveals a reduced left peak pressure of 66 mmHg ejection fraction of 40% with mild aortic insufficiency and severe mitral regurgitation severely dilated left atrium and estimated right ventricular systolic pressure of 66 mmHg with normal PA pressures.    IMAGING STUDIES:          Evidence of congestive heart failure and pulmonary edema is similar to the  earlier study. Pacemaker noted.     IMPRESSION:   1.  Nocturnal hypoxemia may be related to obesity hypoventilation syndrome, obstructive sleep apnea, Cheyne-Qureshi respiration.  2.  Acute on chronic systolic congestive heart failure  3.  Keppra toxicity resolved  4.  Postcardiac injury syndrome/pericarditis  5.  Acute on chronic renal failure  6.  Anemia of chronic disease with iron deficiency  7.  Nonischemic cardiomyopathy  8.  Exogenous obesity  9.  Seizure disorder  10.  Atrial fibrillation  11.  Essential hypertension  12.  Personal history of colorectal carcinoma  13.  Samuels's esophagitis  14.  Hypothyroidism  15.  Hyponatremia, resolved  PLAN:   -Check arterial blood gases to screen  for hypercapnia  -Ultrasensitive TSH to assess adequacy of hormone supplementation  -4-channel unattended home sleep study to screen for obstructive sleep apnea  -In the meantime supplemental oxygen to be worn at night  -Follow-up for compliance visit once CPAP prescribed  -Follow-up with cardiology and neurology, internal medicine as previously scheduled  -Discussed with the hospitalist service  -Further recommendations to follow    Naif Castillo MD, [facility]   98.2

## 2022-07-14 ENCOUNTER — EMERGENCY (EMERGENCY)
Facility: HOSPITAL | Age: 62
LOS: 1 days | Discharge: PSYCHIATRIC OR SUBSTANCE USE FACILITY | End: 2022-07-14
Attending: STUDENT IN AN ORGANIZED HEALTH CARE EDUCATION/TRAINING PROGRAM
Payer: COMMERCIAL

## 2022-07-14 VITALS
OXYGEN SATURATION: 96 % | HEART RATE: 81 BPM | TEMPERATURE: 98 F | SYSTOLIC BLOOD PRESSURE: 186 MMHG | RESPIRATION RATE: 14 BRPM | DIASTOLIC BLOOD PRESSURE: 93 MMHG

## 2022-07-14 DIAGNOSIS — F33.2 MAJOR DEPRESSIVE DISORDER, RECURRENT SEVERE WITHOUT PSYCHOTIC FEATURES: ICD-10-CM

## 2022-07-14 LAB
ANION GAP SERPL CALC-SCNC: 17 MMOL/L — SIGNIFICANT CHANGE UP (ref 5–17)
APAP SERPL-MCNC: <15 UG/ML — SIGNIFICANT CHANGE UP (ref 10–30)
APPEARANCE UR: CLEAR — SIGNIFICANT CHANGE UP
BASOPHILS # BLD AUTO: 0.04 K/UL — SIGNIFICANT CHANGE UP (ref 0–0.2)
BASOPHILS NFR BLD AUTO: 0.6 % — SIGNIFICANT CHANGE UP (ref 0–2)
BILIRUB UR-MCNC: NEGATIVE — SIGNIFICANT CHANGE UP
BUN SERPL-MCNC: 18 MG/DL — SIGNIFICANT CHANGE UP (ref 7–23)
CALCIUM SERPL-MCNC: 9.5 MG/DL — SIGNIFICANT CHANGE UP (ref 8.4–10.5)
CHLORIDE SERPL-SCNC: 106 MMOL/L — SIGNIFICANT CHANGE UP (ref 96–108)
CO2 SERPL-SCNC: 23 MMOL/L — SIGNIFICANT CHANGE UP (ref 22–31)
COLOR SPEC: SIGNIFICANT CHANGE UP
CREAT SERPL-MCNC: 0.94 MG/DL — SIGNIFICANT CHANGE UP (ref 0.5–1.3)
DIFF PNL FLD: NEGATIVE — SIGNIFICANT CHANGE UP
EGFR: 92 ML/MIN/1.73M2 — SIGNIFICANT CHANGE UP
EOSINOPHIL # BLD AUTO: 0.15 K/UL — SIGNIFICANT CHANGE UP (ref 0–0.5)
EOSINOPHIL NFR BLD AUTO: 2.1 % — SIGNIFICANT CHANGE UP (ref 0–6)
ETHANOL SERPL-MCNC: <10 MG/DL — SIGNIFICANT CHANGE UP (ref 0–10)
FLUAV AG NPH QL: SIGNIFICANT CHANGE UP
FLUBV AG NPH QL: SIGNIFICANT CHANGE UP
GLUCOSE SERPL-MCNC: 129 MG/DL — HIGH (ref 70–99)
GLUCOSE UR QL: NEGATIVE — SIGNIFICANT CHANGE UP
HCT VFR BLD CALC: 44.7 % — SIGNIFICANT CHANGE UP (ref 39–50)
HGB BLD-MCNC: 14.7 G/DL — SIGNIFICANT CHANGE UP (ref 13–17)
IMM GRANULOCYTES NFR BLD AUTO: 0.6 % — SIGNIFICANT CHANGE UP (ref 0–1.5)
KETONES UR-MCNC: NEGATIVE — SIGNIFICANT CHANGE UP
LEUKOCYTE ESTERASE UR-ACNC: NEGATIVE — SIGNIFICANT CHANGE UP
LYMPHOCYTES # BLD AUTO: 2.29 K/UL — SIGNIFICANT CHANGE UP (ref 1–3.3)
LYMPHOCYTES # BLD AUTO: 32.2 % — SIGNIFICANT CHANGE UP (ref 13–44)
MCHC RBC-ENTMCNC: 27.9 PG — SIGNIFICANT CHANGE UP (ref 27–34)
MCHC RBC-ENTMCNC: 32.9 GM/DL — SIGNIFICANT CHANGE UP (ref 32–36)
MCV RBC AUTO: 85 FL — SIGNIFICANT CHANGE UP (ref 80–100)
MONOCYTES # BLD AUTO: 0.42 K/UL — SIGNIFICANT CHANGE UP (ref 0–0.9)
MONOCYTES NFR BLD AUTO: 5.9 % — SIGNIFICANT CHANGE UP (ref 2–14)
NEUTROPHILS # BLD AUTO: 4.17 K/UL — SIGNIFICANT CHANGE UP (ref 1.8–7.4)
NEUTROPHILS NFR BLD AUTO: 58.6 % — SIGNIFICANT CHANGE UP (ref 43–77)
NITRITE UR-MCNC: NEGATIVE — SIGNIFICANT CHANGE UP
NRBC # BLD: 0 /100 WBCS — SIGNIFICANT CHANGE UP (ref 0–0)
PH UR: 6 — SIGNIFICANT CHANGE UP (ref 5–8)
PLATELET # BLD AUTO: 333 K/UL — SIGNIFICANT CHANGE UP (ref 150–400)
POTASSIUM SERPL-MCNC: 4.1 MMOL/L — SIGNIFICANT CHANGE UP (ref 3.5–5.3)
POTASSIUM SERPL-SCNC: 4.1 MMOL/L — SIGNIFICANT CHANGE UP (ref 3.5–5.3)
PROT UR-MCNC: NEGATIVE — SIGNIFICANT CHANGE UP
RBC # BLD: 5.26 M/UL — SIGNIFICANT CHANGE UP (ref 4.2–5.8)
RBC # FLD: 12.7 % — SIGNIFICANT CHANGE UP (ref 10.3–14.5)
RSV RNA NPH QL NAA+NON-PROBE: SIGNIFICANT CHANGE UP
SALICYLATES SERPL-MCNC: <2 MG/DL — LOW (ref 15–30)
SARS-COV-2 RNA SPEC QL NAA+PROBE: SIGNIFICANT CHANGE UP
SODIUM SERPL-SCNC: 146 MMOL/L — HIGH (ref 135–145)
SP GR SPEC: 1.01 — SIGNIFICANT CHANGE UP (ref 1.01–1.02)
TSH SERPL-MCNC: 1.78 UIU/ML — SIGNIFICANT CHANGE UP (ref 0.27–4.2)
UROBILINOGEN FLD QL: NEGATIVE — SIGNIFICANT CHANGE UP
WBC # BLD: 7.11 K/UL — SIGNIFICANT CHANGE UP (ref 3.8–10.5)
WBC # FLD AUTO: 7.11 K/UL — SIGNIFICANT CHANGE UP (ref 3.8–10.5)

## 2022-07-14 PROCEDURE — 99285 EMERGENCY DEPT VISIT HI MDM: CPT

## 2022-07-14 PROCEDURE — 93010 ELECTROCARDIOGRAM REPORT: CPT

## 2022-07-14 RX ORDER — QUETIAPINE FUMARATE 200 MG/1
100 TABLET, FILM COATED ORAL ONCE
Refills: 0 | Status: COMPLETED | OUTPATIENT
Start: 2022-07-14 | End: 2022-07-14

## 2022-07-14 RX ADMIN — QUETIAPINE FUMARATE 100 MILLIGRAM(S): 200 TABLET, FILM COATED ORAL at 21:30

## 2022-07-14 NOTE — ED BEHAVIORAL HEALTH ASSESSMENT NOTE - NSSUICRSKFACTOR_PSY_ALL_CORE
Current and Past Psychiatric Diagnoses/Presenting Symptoms/Treatment Related Factors/Activating Events/Stressors Current and Past Psychiatric Diagnoses/Presenting Symptoms/Activating Events/Stressors

## 2022-07-14 NOTE — ED PROVIDER NOTE - OBJECTIVE STATEMENT
63yo M pmh long-standing depression, Prior hip surgery, opiate abuse and etOH abuse (sober for >3yrs) presenting to [facility] ED for suicidal ideation. Pt reports over the past month has been increasingly apathetic, disorganized, poor concentration and sad without purpose. Has had seroquel dosing decreased several months ago. Only identifiable stressor in life right now is poor relationship with daughter. Pt does not want currently plan to end his life however has had passing thoughts of taking a hand full of seroquel to fall asleep and not wake up. No firearms at home, no prior suicide attempts. Denies Auditory/visual hallucinations. No drug use.     Home meds: Vraylar 1.5mg qd, Quetiapine 100mg qhs, Fluoxetine 20mg qd

## 2022-07-14 NOTE — ED BEHAVIORAL HEALTH ASSESSMENT NOTE - LEGAL HISTORY
obtained custody of his son and daughter from previous marriage 20 years ago denies.  Denies access to guns

## 2022-07-14 NOTE — ED BEHAVIORAL HEALTH ASSESSMENT NOTE - CURRENT MEDICATION
Seroquel 100mg, Prozac 40mg, Vraylar (unknown dose, frequency) Seroquel 100mg PO qHS (decreased from 200mg, felt higher dose was more helpful), Prozac 40mg daily (increased from 20mg recently), Vraylar 1.5mg daily (could not tolerate 3mg- was causing "muscle contractions")

## 2022-07-14 NOTE — ED BEHAVIORAL HEALTH ASSESSMENT NOTE - SUMMARY
62M , domiciled with wife, son, and step-daughter, employed at a [employer] shop, noncaregiver, with PPHx depression and prior alcohol and opioid use disorders (in remission since April 2019), currently in OP psych tx with psychiatric NP Joao and psychiatrist Dr. Danielle at [facility] ([phone]), no prior SA or psych admissions, no current substance abuse, remote h/o rehab for addiction, with no significant PMH self-referred to ER for SI.  Pt with worsening depression over past year, acutely worse in past couple of months in s/o estrangement from daughter, developed SI to OD on Seroquel 3 days ago, unsure if he would be able to abstain from attempting.  Seeking inpt psychiatric tx, completed voluntary legals.  Denies mini, psychosis, or substance relapse (regularly attends AA).

## 2022-07-14 NOTE — ED BEHAVIORAL HEALTH ASSESSMENT NOTE - PSYCHIATRIC ISSUES AND PLAN (INCLUDE STANDING AND PRN MEDICATION)
c/w Vraylar, Prozac for now.  Increase Seroquel to 200mg PO qHS.  Add Cogentin 0.5mg PO BID (pt with EPS, c/o tremor)

## 2022-07-14 NOTE — ED ADULT NURSE REASSESSMENT NOTE - DESCRIPTION
pt was eval;uated by psychiatry and now is a voluntary admission for inpatient psychiatry. Pt has been cooperative, pt requested to be admitted to [facility]

## 2022-07-14 NOTE — ED BEHAVIORAL HEALTH ASSESSMENT NOTE - DIFFERENTIAL
Active suicidal ideation due to major depression MDD vs. bipolar d/o  substance use d/o in remission

## 2022-07-14 NOTE — ED ADULT TRIAGE NOTE - CHIEF COMPLAINT QUOTE
pt is treated for depression and has had thoughts of taking extra pills  pt has never tried before has addiction in the past 3 years clean

## 2022-07-14 NOTE — ED ADULT NURSE REASSESSMENT NOTE - NS ED NURSE REASSESS COMMENT FT1
received pt. awake, calm and updated the plan of care, requesting to have his bedtime dose of seroquel 100mg, told him that will alert ER MD. 1:1 CO for s/i maintained.

## 2022-07-14 NOTE — ED PROVIDER NOTE - CLINICAL SUMMARY MEDICAL DECISION MAKING FREE TEXT BOX
63yo M pmh depression, former etOH and opiate abuse (percocet after hip surgery) presenting for passive SI, no current desire to end life. Pt reports wants to get better however is losing hope bc has been on many different meds in the past. Primary symptom is apathy.

## 2022-07-14 NOTE — ED PROVIDER NOTE - PROGRESS NOTE DETAILS
Enrique Matthew MD (Attending Physician):    62M with PMHx depression on seroquel, prozac presenting with SI. Has a long history of depression and states has been having worsening anhedonia. Reported having thoughts of overdosing on seroquel however did not attempt. No h/o suicide attempts in the past.     Differentials include but are not limited to: infection, electrolyte abnormalities, medication noncompliance, depression, stress disorder. Plan: labs for medical clearance and consultation with psych Sundar paged, will see patient received sign out from Dr Matthew pending volun admission. 86yr hx of depression with increased despair and feeling of SI. DJ

## 2022-07-14 NOTE — ED BEHAVIORAL HEALTH ASSESSMENT NOTE - RISK ASSESSMENT
High Acute Suicide Risk Protective Factors: Risk factors: +current SIIP, mood episode, hopelessness, decline in self-care    Protective factors: no current HIIP, no h/o SA/SIB, no h/o psych admissions, no access to weapons, good physical health, domiciled, intact marriage, social supports, positive therapeutic relationship, engaged in treatment, compliant with treatment, help-seeking behaviors    Pt is at acutely elevated risk of harm and requires inpt psychiatric admission for safety and stabilization.

## 2022-07-14 NOTE — ED BEHAVIORAL HEALTH ASSESSMENT NOTE - OTHER PAST PSYCHIATRIC HISTORY (INCLUDE DETAILS REGARDING ONSET, COURSE OF ILLNESS, INPATIENT/OUTPATIENT TREATMENT)
pt diagnosed with depression 20 years ago pt diagnosed with depression 20 years ago  no prior SA or psych admissions  in OP psych tx with psychiatric NP Joao
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
## 2022-07-14 NOTE — CHART NOTE - NSCHARTNOTEFT_GEN_A_CORE
Patient was referred to Social work by Psychiatry MD for inpatient psychiatric admission. Chart was reviewed.  Patient is a 62-year-old  male presented to the ED for increased Severe episode of recurrent major depressive disorder, without psychotic features. Per psychiatry patient to be voluntarily admitted with a diagnosis of Severe episode of recurrent major depressive disorder, without psychotic features.   LMSW contacted [facility]. Patient offered other psychiatric facilities and requested only to be referred to [facility]. There are no beds available. Attending physician and psychiatrist made aware.  LMSW provided an update to patient and wife at bedside. Patient to remain in the emergency room on 1:1 for safety.  Patient has [employer] insurance benefit.  LMSW will endorse to incoming  for further follow up in the morning.  Telepsych notification completed as per protocol.  LSMW will continue to follow up as needed.

## 2022-07-14 NOTE — ED BEHAVIORAL HEALTH ASSESSMENT NOTE - HPI (INCLUDE ILLNESS QUALITY, SEVERITY, DURATION, TIMING, CONTEXT, MODIFYING FACTORS, ASSOCIATED SIGNS AND SYMPTOMS)
62M , domiciled with wife, son, and step-daughter, employed at a [employer] shop, noncaregiver, with PPHx depression and prior alcohol and opioid use disorders (in remission since April 2019), currently in OP psych tx with psychiatric NP Joao and psychiatrist Dr. Danielle at [facility] ([phone]), no prior SA or psych admissions, no current substance abuse, remote h/o rehab for addiction, with no significant PMH self-referred to ER for SI.    Pt seen in ER, anxious but cooperative and forthcoming.  Pt reports he has been depressed since last August, declining despite outpatient medication changes by his psychiatric provider.  States he decided to come to the ER because 3 days ago, he began to feel like he "wants to go to sleep and not wake up," thought about overdosing on Seroquel but realized he did not have enough tablets.  Still wishes he was dead, states he is unsure if he could remain safe if discharged.  Pt states he is so anguished that sleeping is the only comfort in his life.  Pt has a good support system, but has been isolating in the s/o depression; does not wish to be surrounded by any family or even his grandchildren. He states he wants "nothing to do with them" as he is disinterested in everything that he previously enjoyed.  States he could sleep all day if permitted, but forces himself to get up.  Experiences constant worthlessness and fatigue, concentration loss, and decreased appetite- has lost 10lbs over the past month.  States he goes to therapy but feels as though it does not help when talking about his concerns because he does not feel better. He forces himself to continue to go to AA (Alcoholic's Anonymous) and thinks others do not want to hear about his struggles.  Pt does not identify clear triggers, but has been ruminating about his an estranged relationship with his daughter who he has not seen in 5 years. Pts wife states his daughter did not visit him for Father's Day and this triggered worsening severe depression over the past 2 months.    Wife in ED with pt providing collateral; confirms above hx, states pt has been severely depressed, loss of functioning, isolative.  She believes he would benefit from inpt psychiatric treatment. 62M , domiciled with wife, son, and step-daughter, employed at a [employer] shop, noncaregiver, with PPHx depression and prior alcohol and opioid use disorders (in remission since April 2019), currently in OP psych tx with psychiatric NP Joao and psychiatrist Dr. Danielle at [facility] ([phone]), no prior SA or psych admissions, no current substance abuse, remote h/o rehab for addiction, with no significant PMH self-referred to ER for SI.    Pt seen in ER, anxious but cooperative and forthcoming.  Pt reports he has been depressed since last August, declining despite outpatient medication changes by his psychiatric provider.  States he decided to come to the ER because 3 days ago, he began to feel like he "wants to go to sleep and not wake up," thought about overdosing on Seroquel but realized he did not have enough tablets.  Still wishes he was dead, states he is unsure if he could remain safe if discharged.  Pt states he is so anguished that sleeping is the only comfort in his life.  Pt has a good support system, but has been isolating in the s/o depression; does not wish to be surrounded by any family or even his grandchildren. He states he wants "nothing to do with them" as he is disinterested in everything that he previously enjoyed.  States he could sleep all day if permitted, but forces himself to get up.  Experiences constant worthlessness and fatigue, concentration loss, and decreased appetite- has lost 10lbs over the past month.  States he goes to therapy but feels as though it does not help when talking about his concerns because he does not feel better. He forces himself to continue to go to AA (Alcoholic's Anonymous) and thinks others do not want to hear about his struggles.  Pt does not identify clear triggers, but has been ruminating about his an estranged relationship with his daughter who he has not seen in 5 years. Pts wife states his daughter did not visit him for Father's Day and this triggered worsening severe depression over the past 2 months.    Wife in ED with pt providing collateral; confirms above hx, states pt has been severely depressed, loss of functioning, isolative.  She believes he would benefit from inpt psychiatric treatment.    Message left for pt's OP psychiatric provider requesting callback. 62M , domiciled with wife, son, and step-daughter, employed at a [employer] shop, noncaregiver, with PPHx depression and prior alcohol and opioid use disorders (in remission since April 2019), currently in OP psych tx with psychiatric NP Joao and psychiatrist Dr. Danielle at [facility] ([phone]), no prior SA or psych admissions, no current substance abuse, remote h/o rehab for addiction, with no significant PMH self-referred to ER for SI.    Pt seen in ER, anxious but cooperative and forthcoming.  Pt reports he has been depressed since last August, declining despite outpatient medication changes by his psychiatric provider.  States he decided to come to the ER because 3 days ago, he began to feel like he "wants to go to sleep and not wake up," thought about overdosing on Seroquel but realized he did not have enough tablets.  Still wishes he was dead, states he is unsure if he could remain safe if discharged.  Pt states he is so anguished that sleeping is the only comfort in his life.  Pt has a good support system, but has been isolating in the s/o depression; does not wish to be surrounded by any family or even his grandchildren. He states he wants "nothing to do with them" as he is disinterested in everything that he previously enjoyed.  States he could sleep all day if permitted, but forces himself to get up.  Experiences constant worthlessness and fatigue, concentration loss, and decreased appetite- has lost 10lbs over the past month.  States he goes to therapy but feels as though it does not help when talking about his concerns because he does not feel better. He forces himself to continue to go to AA (Alcoholic's Anonymous) and thinks others do not want to hear about his struggles.  Denies AVH, paranoia, or manic sx, denies substance relapse.  Pt does not identify clear triggers, but has been ruminating about his an estranged relationship with his daughter who he has not seen in 5 years.  Pts wife states his daughter did not visit him for Father's Day and this triggered worsening severe depression over the past 2 months.    Wife in ED with pt providing collateral; confirms above hx, states pt has been severely depressed, loss of functioning, isolative.  She believes he would benefit from inpt psychiatric treatment.    Message left for pt's OP psychiatric provider requesting callback.

## 2022-07-14 NOTE — ED BEHAVIORAL HEALTH ASSESSMENT NOTE - DESCRIPTION
T(C): 36.8 (07-14-22 @ 10:41), Max: 36.8 (07-14-22 @ 10:41)  HR: 81 (07-14-22 @ 10:41) (81 - 81)  BP: 186/93 (07-14-22 @ 10:41) (186/93 - 186/93)  RR: 14 (07-14-22 @ 10:41) (14 - 14)  SpO2: 96% (07-14-22 @ 10:41) (96% - 96%)  Wt(kg): -- Depression Pt has past history of alcohol and opioid abuse and has been sober since April 2019. Pt goes to AA in order to stay on track. denies , has 2 children from first marriage, works in a [employer] shop, lives with spouse and 2 children

## 2022-07-14 NOTE — ED PROVIDER NOTE - ATTENDING CONTRIBUTION TO CARE
I, Enrique Matthew, performed a history and physical exam of the patient and discussed their management with the resident and /or advanced care provider. I reviewed the resident and /or ACP's note and agree with the documented findings and plan of care. I was present and available for all procedures.
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
## 2022-07-14 NOTE — ED ADULT NURSE REASSESSMENT NOTE - NS ED NURSE REASSESS COMMENT FT1
pt. was updated the plan of care, since there is no psych bed available, he will stay in the ED till a psych bed is secured. was given his night time dose of seroquel 100mg. 1:1 CO for s/i maintained.

## 2022-07-14 NOTE — ED PROVIDER NOTE - PHYSICAL EXAMINATION
GENERAL: non-toxic appearing, alert, in NAD  HEENT: atraumatic, normocephalic, Vision grossly intact, no conjunctivitis or discharge, hearing grossly intact,  no nasal discharge, epistaxis   CARDIAC: RRR, normal S1S2,  no appreciable murmurs, no cyanosis, cap refill < 2 seconds  PULM: normal work of breathing, oxygen saturation on RA wnl, CTAB, no crackles, rales, rhonchi, or wheezing  GI: abdomen nondistended, soft, nontender, no guarding or rebound tenderness, no palpable masses  NEURO: awake and alert, follows commands, normal speech, PERRLA, EOMI, no focal motor or sensory deficits  MSK: spine appears normal, no joint swelling or erythema, ranging all extremities with no appreciable loss of ROM  EXT: no peripheral edema, calf tenderness, redness or swelling  SKIN: warm, dry, and intact, no rashes  PSYCH: appropriate mood and affect, denies SI/HI currently however has had thoughts as per HPI. No psychomotor agitation, agreeable.

## 2022-07-15 ENCOUNTER — INPATIENT (INPATIENT)
Facility: HOSPITAL | Age: 62
LOS: 5 days | Discharge: HOME | End: 2022-07-21
Attending: PSYCHIATRY & NEUROLOGY | Admitting: PSYCHIATRY & NEUROLOGY

## 2022-07-15 VITALS
RESPIRATION RATE: 17 BRPM | TEMPERATURE: 98 F | HEART RATE: 70 BPM | OXYGEN SATURATION: 96 % | SYSTOLIC BLOOD PRESSURE: 136 MMHG | DIASTOLIC BLOOD PRESSURE: 79 MMHG

## 2022-07-15 VITALS — WEIGHT: 199.96 LBS | HEIGHT: 70 IN | TEMPERATURE: 97 F

## 2022-07-15 DIAGNOSIS — F33.2 MAJOR DEPRESSIVE DISORDER, RECURRENT SEVERE WITHOUT PSYCHOTIC FEATURES: ICD-10-CM

## 2022-07-15 PROCEDURE — 36415 COLL VENOUS BLD VENIPUNCTURE: CPT

## 2022-07-15 PROCEDURE — 84443 ASSAY THYROID STIM HORMONE: CPT

## 2022-07-15 PROCEDURE — 93005 ELECTROCARDIOGRAM TRACING: CPT

## 2022-07-15 PROCEDURE — 87637 SARSCOV2&INF A&B&RSV AMP PRB: CPT

## 2022-07-15 PROCEDURE — 99214 OFFICE O/P EST MOD 30 MIN: CPT

## 2022-07-15 PROCEDURE — 80307 DRUG TEST PRSMV CHEM ANLYZR: CPT

## 2022-07-15 PROCEDURE — 80048 BASIC METABOLIC PNL TOTAL CA: CPT

## 2022-07-15 PROCEDURE — 99285 EMERGENCY DEPT VISIT HI MDM: CPT | Mod: 25

## 2022-07-15 PROCEDURE — 85025 COMPLETE CBC W/AUTO DIFF WBC: CPT

## 2022-07-15 PROCEDURE — 81003 URINALYSIS AUTO W/O SCOPE: CPT

## 2022-07-15 RX ORDER — POLYETHYLENE GLYCOL 3350 17 G/17G
17 POWDER, FOR SOLUTION ORAL DAILY
Refills: 0 | Status: DISCONTINUED | OUTPATIENT
Start: 2022-07-15 | End: 2022-07-21

## 2022-07-15 RX ORDER — QUETIAPINE FUMARATE 200 MG/1
100 TABLET, FILM COATED ORAL ONCE
Refills: 0 | Status: DISCONTINUED | OUTPATIENT
Start: 2022-07-15 | End: 2022-07-17

## 2022-07-15 RX ORDER — CARIPRAZINE 1.5 MG/1
1.5 CAPSULE, GELATIN COATED ORAL DAILY
Refills: 0 | Status: DISCONTINUED | OUTPATIENT
Start: 2022-07-15 | End: 2022-07-16

## 2022-07-15 RX ORDER — VORTIOXETINE 5 MG/1
1 TABLET, FILM COATED ORAL
Qty: 30 | Refills: 0
Start: 2022-07-15 | End: 2022-08-13

## 2022-07-15 RX ORDER — FLUOXETINE HCL 10 MG
40 CAPSULE ORAL DAILY
Refills: 0 | Status: DISCONTINUED | OUTPATIENT
Start: 2022-07-15 | End: 2022-07-16

## 2022-07-15 RX ORDER — QUETIAPINE FUMARATE 200 MG/1
100 TABLET, FILM COATED ORAL AT BEDTIME
Refills: 0 | Status: DISCONTINUED | OUTPATIENT
Start: 2022-07-15 | End: 2022-07-21

## 2022-07-15 RX ORDER — FLUOXETINE HCL 10 MG
40 CAPSULE ORAL ONCE
Refills: 0 | Status: COMPLETED | OUTPATIENT
Start: 2022-07-15 | End: 2022-07-15

## 2022-07-15 RX ORDER — HYDROXYZINE HCL 10 MG
50 TABLET ORAL EVERY 6 HOURS
Refills: 0 | Status: DISCONTINUED | OUTPATIENT
Start: 2022-07-15 | End: 2022-07-21

## 2022-07-15 RX ORDER — ACETAMINOPHEN 500 MG
650 TABLET ORAL EVERY 6 HOURS
Refills: 0 | Status: DISCONTINUED | OUTPATIENT
Start: 2022-07-15 | End: 2022-07-21

## 2022-07-15 RX ADMIN — QUETIAPINE FUMARATE 100 MILLIGRAM(S): 200 TABLET, FILM COATED ORAL at 21:37

## 2022-07-15 RX ADMIN — Medication 40 MILLIGRAM(S): at 10:48

## 2022-07-15 NOTE — BH INPATIENT PSYCHIATRY ASSESSMENT NOTE - NSBHASSESSSUMMFT_PSY_ALL_CORE
62M w/pphx of depression/anxiety, admitted with depressive symptoms meeting criteria for MDE at this time.      1. Consider ECT, pt ambivalent, pt to think about it.  2. Continue home meds for now.  3. Collateral.

## 2022-07-15 NOTE — BH INPATIENT PSYCHIATRY ASSESSMENT NOTE - DESCRIPTION
Lives with wife.  Has adult children from previous marriage, and adult stepchildren.  Works in [employer] shop.

## 2022-07-15 NOTE — PROGRESS NOTE BEHAVIORAL HEALTH - SUMMARY
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 Danielle at [facility] ([phone]), no prior SA or psych admissions, no current substance abuse, remote h/o rehab for addiction, with no significant PMH self-referred to ER for SI.  Pt with worsening depression over past year, acutely worse in past couple of months in s/o estrangement from daughter, developed SI to OD on Seroquel 3 days ago, unsure if he would be able to abstain from attempting.  Seeking inpt psychiatric tx, completed voluntary legals.  Denies mini, psychosis, or substance relapse (regularly attends AA).

## 2022-07-15 NOTE — PROGRESS NOTE BEHAVIORAL HEALTH - NSBHFUPINTERVALHXFT_PSY_A_CORE
Pt c/o ongoing depression and anxiety, feels overwhelmed, sleep interrupted due to boarding in ED overnight.  Still feels hopeless/helpless, awaiting transfer to inpt psych.

## 2022-07-15 NOTE — BH INPATIENT PSYCHIATRY ASSESSMENT NOTE - OTHER PAST PSYCHIATRIC HISTORY (INCLUDE DETAILS REGARDING ONSET, COURSE OF ILLNESS, INPATIENT/OUTPATIENT TREATMENT)
Hx of depression, anxiety.  No prior psych admissions.  In outpatient care - psychiatric NP Joao and psychiatrist Dr. Danielle at [facility] ([phone]).

## 2022-07-15 NOTE — PROGRESS NOTE BEHAVIORAL HEALTH - NSBHCHARTREVIEWINVESTIGATE_PSY_A_CORE FT
< from: 12 Lead ECG (07.14.22 @ 11:51) >      Ventricular Rate 58 BPM    Atrial Rate 58 BPM    P-R Interval 194 ms    QRS Duration 96 ms    Q-T Interval 408 ms    QTC Calculation(Bazett) 400 ms    P Axis 35 degrees    R Axis -39 degrees    T Axis 29 degrees    Diagnosis Line SINUS BRADYCARDIA  LEFT AXIS DEVIATION  ABNORMAL ECG  NO PREVIOUS ECGS AVAILABLE  Confirmed by MD NEREIDA, GHASSAN ([id number]) on 7/15/2022 9:25:03 AM    < end of copied text >

## 2022-07-15 NOTE — CHART NOTE - NSCHARTNOTEFT_GEN_A_CORE
Screening Medical Evaluation    Patient Admitted from: [facility] admitting diagnosis: Severe episode of recurrent major depressive disorder, without psychotic features      PAST MEDICAL & SURGICAL HISTORY:  Anxiety and depression            Allergies    No Known Allergies    Intolerances          Social History:       FAMILY HISTORY:        MEDICATIONS  (STANDING):  cariprazine 1.5 milliGRAM(s) Oral daily  FLUoxetine 40 milliGRAM(s) Oral daily  QUEtiapine 100 milliGRAM(s) Oral at bedtime    MEDICATIONS  (PRN):  acetaminophen     Tablet .. 650 milliGRAM(s) Oral every 6 hours PRN Moderate Pain (4 - 6)  hydrOXYzine hydrochloride 50 milliGRAM(s) Oral every 6 hours PRN anxiety/agitation  LORazepam   Injectable 2 milliGRAM(s) IntraMuscular once PRN agitation  polyethylene glycol 3350 17 Gram(s) Oral daily PRN constipation        Vital Signs Last 24 Hrs  T(C): 36.6 (15 Jul 2022 16:05), Max: 36.7 (2022 21:29)  T(F): 97.8 (15 Jul 2022 16:05), Max: 98.1 (2022 21:29)  HR: 70 (15 Jul 2022 11:32) (64 - 78)  BP: 136/79 (15 Jul 2022 11:32) (133/85 - 158/80)  BP(mean): --  RR: 17 (15 Jul 2022 11:32) (17 - 18)  SpO2: 96% (15 Jul 2022 11:32) (93% - 97%)      CAPILLARY BLOOD GLUCOSE            PHYSICAL EXAM:  GENERAL: NAD, well-developed  HEAD:  Atraumatic, Normocephalic  EYES: EOMI, PERRLA, conjunctiva and sclera clear  NECK: Supple, No JVD  CHEST/LUNG: Clear to auscultation bilaterally; No wheeze  HEART: Regular rate and rhythm; No murmurs, rubs, or gallops  ABDOMEN: Soft, Nontender, Nondistended; Bowel sounds present  EXTREMITIES:  2+ Peripheral Pulses, No clubbing, cyanosis, or edema  PSYCH: AAOx3  NEUROLOGY: non-focal  SKIN: No rashes or lesions    LABS:                        14.7   7.11  )-----------( 333      ( 2022 12:23 )             44.7     07-14    146<H>  |  106  |  18  ----------------------------<  129<H>  4.1   |  23  |  0.94    Ca    9.5      2022 12:23            Urinalysis Basic - ( 2022 15:29 )    Color: Light Yellow / Appearance: Clear / S.011 / pH: x  Gluc: x / Ketone: Negative  / Bili: Negative / Urobili: Negative   Blood: x / Protein: Negative / Nitrite: Negative   Leuk Esterase: Negative / RBC: x / WBC x   Sq Epi: x / Non Sq Epi: x / Bacteria: x        RADIOLOGY & ADDITIONAL TESTS:      Assessment and Plan:  __M/F admitted to [facility] for _____ w/ PMHx of ____ seen at bedside for medical screening evaluation. Patient has no acute complaints at this time. Patient denies fever, chills, headache, dizziness, lightheadedness, N/V, SOB, cough, congestion, chest pain, abdominal pain, dysuria, hematuria, diarrhea, constipation. Physical exam unremarkable, VSS. Labs _____. EKG _____.     1.)   2.)  3.) Screening Medical Evaluation    Patient Admitted from: [facility] admitting diagnosis: Severe episode of recurrent major depressive disorder, without psychotic features      PAST MEDICAL & SURGICAL HISTORY:  Anxiety and depression            Allergies    No Known Allergies    Intolerances          Social History:       FAMILY HISTORY:        MEDICATIONS  (STANDING):  cariprazine 1.5 milliGRAM(s) Oral daily  FLUoxetine 40 milliGRAM(s) Oral daily  QUEtiapine 100 milliGRAM(s) Oral at bedtime    MEDICATIONS  (PRN):  acetaminophen     Tablet .. 650 milliGRAM(s) Oral every 6 hours PRN Moderate Pain (4 - 6)  hydrOXYzine hydrochloride 50 milliGRAM(s) Oral every 6 hours PRN anxiety/agitation  LORazepam   Injectable 2 milliGRAM(s) IntraMuscular once PRN agitation  polyethylene glycol 3350 17 Gram(s) Oral daily PRN constipation        Vital Signs Last 24 Hrs  T(C): 36.6 (15 Jul 2022 16:05), Max: 36.7 (2022 21:29)  T(F): 97.8 (15 Jul 2022 16:05), Max: 98.1 (2022 21:29)  HR: 70 (15 Jul 2022 11:32) (64 - 78)  BP: 136/79 (15 Jul 2022 11:32) (133/85 - 158/80)  BP(mean): --  RR: 17 (15 Jul 2022 11:32) (17 - 18)  SpO2: 96% (15 Jul 2022 11:32) (93% - 97%)      CAPILLARY BLOOD GLUCOSE            PHYSICAL EXAM:  GENERAL: NAD, well-developed  HEAD:  Atraumatic, Normocephalic  EYES: Wears glasses, EOMI, Conjunctiva and sclera clear  NECK: Supple, No JVD  CHEST/LUNG: Clear to auscultation bilaterally; No wheeze  HEART: Regular rate and rhythm; No murmurs, rubs, or gallops  ABDOMEN: Soft, Nontender, Nondistended; Bowel sounds present  EXTREMITIES:  2+ Peripheral Pulses, No clubbing, cyanosis, or edema  PSYCH: AAOx3  NEUROLOGY: non-focal  SKIN: No rashes or lesions    LABS:                        14.7   7.11  )-----------( 333      ( 2022 12:23 )             44.7     07-14    146<H>  |  106  |  18  ----------------------------<  129<H>  4.1   |  23  |  0.94    Ca    9.5      2022 12:23            Urinalysis Basic - ( 2022 15:29 )    Color: Light Yellow / Appearance: Clear / S.011 / pH: x  Gluc: x / Ketone: Negative  / Bili: Negative / Urobili: Negative   Blood: x / Protein: Negative / Nitrite: Negative   Leuk Esterase: Negative / RBC: x / WBC x   Sq Epi: x / Non Sq Epi: x / Bacteria: x    22: EKG: SB@ 58b/ min, LAD. QT/ QTC= 408/ 400    RADIOLOGY & ADDITIONAL TESTS:      Assessment and Plan:  62M admitted to [facility] for severe episode of recurrent major depressive disorder, without psychotic features. No past medical history. Past psych history of anxiety and depression. Patient seen at bedside for medical screening evaluation. Patient has no acute complaints at this time. Patient denies fever, chills, headache, dizziness, lightheadedness, falls, nausea, vomit, abdominal pain, SOB, cough, chest pain, dysuria, chills, myalgias. Physical exam unremarkable, VSS.  Labs shows slightly Hypernatremia @ 146. 22: EKG: SB@ 58b/ min, LAD. QT/ QTC= 408/ 400     1.) Recurrent major depressive disorder, without psychotic features: Plan as per primary team.

## 2022-07-15 NOTE — BH CHART NOTE - NSEVENTNOTEFT_PSY_ALL_CORE
[facility] - [phone]  Nurse Practitioner - Nica Starr (currently on vacation at time of attempted call to office)    Called [facility] in an attempt to get in touch with Nurse Practitioner Nica Starr - patient's outpatient mental health provider.  Mabel (?) picked up and stated that she would be able to provide information. She relayed that his last visit with them was approximately 1 week ago on July 7th, 2022 and they noted that he felt the same as prior recent visits - feels low, hopeless, struggling, isolating from others, no emotions, tired, detached were all phrases they documented at the time of the visit. Confirmed his doses of medications are fluoxetine 30 mg, Vraylar 1.5 mg, and Seroquel 100 mg. His diagnoses are MDD and JAYRO.

## 2022-07-15 NOTE — PROGRESS NOTE BEHAVIORAL HEALTH - RISK ASSESSMENT
Risk factors: +current SIIP, mood episode, hopelessness, decline in self-care    Protective factors: no current HIIP, no h/o SA/SIB, no h/o psych admissions, no access to weapons, good physical health, domiciled, intact marriage, social supports, positive therapeutic relationship, engaged in treatment, compliant with treatment, help-seeking behaviors    Pt is at acutely elevated risk of harm and requires inpt psychiatric admission for safety and stabilization.

## 2022-07-15 NOTE — BH INPATIENT PSYCHIATRY ASSESSMENT NOTE - NSBHCHARTREVIEWVS_PSY_A_CORE FT
Vital Signs Last 24 Hrs  T(C): 36.2 (07-15-22 @ 12:39), Max: 36.7 (07-14-22 @ 18:52)  T(F): 97.1 (07-15-22 @ 12:39), Max: 98.1 (07-14-22 @ 18:52)  HR: 70 (07-15-22 @ 11:32) (59 - 78)  BP: 136/79 (07-15-22 @ 11:32) (133/85 - 169/84)  BP(mean): --  RR: 17 (07-15-22 @ 11:32) (17 - 18)  SpO2: 96% (07-15-22 @ 11:32) (93% - 98%)    Orthostatic VS  07-15-22 @ 12:39  Lying BP: --/-- HR: --  Sitting BP: 137/90 HR: 88  Standing BP: 143/90 HR: 94  Site: --  Mode: --

## 2022-07-15 NOTE — PROGRESS NOTE BEHAVIORAL HEALTH - NSBHCONSULTMEDS_PSY_A_CORE FT
c/w Vraylar, Prozac for now    Increase Seroquel to 200mg PO qHS      Add Cogentin 0.5mg PO BID (pt with EPS, c/o tremor)

## 2022-07-15 NOTE — PROGRESS NOTE BEHAVIORAL HEALTH - NSBHCHARTREVIEWVS_PSY_A_CORE FT
Vital Signs Last 24 Hrs  T(C): 36.4 (15 Jul 2022 06:00), Max: 36.7 (14 Jul 2022 18:52)  T(F): 97.5 (15 Jul 2022 06:00), Max: 98.1 (14 Jul 2022 18:52)  HR: 64 (15 Jul 2022 06:00) (59 - 78)  BP: 133/85 (15 Jul 2022 06:00) (133/85 - 169/84)  BP(mean): --  RR: 18 (15 Jul 2022 06:00) (17 - 18)  SpO2: 96% (15 Jul 2022 06:00) (93% - 98%)    Parameters below as of 15 Jul 2022 06:00  Patient On (Oxygen Delivery Method): room air

## 2022-07-15 NOTE — BH PATIENT PROFILE - BRADEN MOBILITY
Spoke with patient via phone for follow up anticoagulation visit.   Last INR on 03/30/2022 was 2.8.  Dose maintained.   Today's INR is 2.5 and is within goal range.    Current warfarin total weekly dose of 24 mg verified.  Informed the INR result is within therapeutic range and instructed to maintain current dose per protocol. Discussed dose and return date of 05/11/2022 for next INR. See Anticoagulation flowsheet.    Dr. D'Amico  is in the office today supervising the treatment.    Instructed to contact the clinic with any unusual bleeding or bruising, any changes in medications, diet, health status, lifestyle, or any other changes, questions or concerns. Verbalized understanding of all discussed.      (4) no limitation

## 2022-07-15 NOTE — ED BEHAVIORAL HEALTH NOTE - BEHAVIORAL HEALTH NOTE
Called RN to discuss patient status and got updated on patient. RN reports patient is currently sleeping without any difficulties. Patient has been reportedly calm and cooperative. Patient received Seroquel 100mg qHS at night for sleeping. Patient ate dinner. At this time patient sleeping and thus can reassess in the morning.    Continue to hold for bed at this time.

## 2022-07-15 NOTE — BH INPATIENT PSYCHIATRY ASSESSMENT NOTE - HPI (INCLUDE ILLNESS QUALITY, SEVERITY, DURATION, TIMING, CONTEXT, MODIFYING FACTORS, ASSOCIATED SIGNS AND SYMPTOMS)
62M, lives with wife, has adult children from prior marriage, adult stepchildren from current marriage, works in [employer] shop, with pphx of depression/anxiety, substance use (in remission), no prior hx of psychiatric admissions but does have hx of rehab in remote past and detox at [facility] in 2019, no prior suicide attempts, who presented to [facility] with worsening depression and fleeting thought to take more quetiapine than prescribed.    Pt states that he had ETOH and substance use issues as a young adult, but entered rehab when he was 27 years and was subsequently sober for >30 years.  Pt states he has had self esteem issues since early adulthood as most of his peers had gone on to have successful careers and families.  After rehab, he began looking for a job and got  in his 30s to his first wife, states that marriage was tumultuous, and it resulted in a stressful divorce where pt and his ex-wife battled over custody.  Pt during this time had lots of anxiety, and his PMD started sertraline with good effect.  Pt reports that custody castano over his biological daughter also ended up being stressful after court's decision for children to remain with him, as daughter indicated afterwards that she wanted to live with her mother, and so his daughter went to live in [place] while his son stayed with him, his second wife and her children.  Pt states that marriage to 2nd wife for first 10 years was very happy with much family time and many trips.  In 2013, pt began misusing pain medications that he had received after bilateral hip replacement, and while he still remained functional socially and occupationally, this did put stress on his marriage.  In 2019 pt entered detox and has been sober since.  Pt states that his biological daughter went to college in [place] and was not as supportive as he would have hoped, pt in more recent years had expectations that daughter would reciprocate effort that he was putting into their relationship. Approximately 1 year ago, daughter was supposed to visit him in [place] (he had not seen her in 4 years), but she cancelled the visit.  In addition, pt's dog became ill during this time.  Due to these stressors, pt became very "anxious and paranoid" and did not leave his home.  Pt was brought to ER but was discharged home and he began treatment with current outpatient providers.  Medication trials continued, pt states there was improvement, however 6 weeks ago, pt began abruptly doing worse - this was in context of his daughter cancelling another visit for Father's Day.    Pt describes worsening depressed mood over last 6 weeks with anhedonia (does not want to spend time with family and has difficulty with ADLs).  He reports hypersomnia and decrease in appetite.  Pt has not been able to concentrate at work.  He has feelings of guilt/remorse regarding his daughter.  Pt denies any history of mini or psychosis.  Pt reports having recent fleeting thought of taking a handful of quetiapine, however he states this was more to sleep and attributes it to his substance use history, states he does not really believe it was suicidal thinking.  Pt denies any premeditative acts (no researching, no suicide notes, no writing mayen or giving away belongings, no saying goodbyes).  He denies any thoughts of harming others.  Pt also describes having forgetfulness over last 1 year, will sometimes forget where keys are, will walk into room and forget what he was doing, has left oven on, and once forgot to put truck in park before getting out of vehicle.  Pt states he has difficulty doing shopping and food preparation which were usually his responsibilities.  Wife does housekeeping and finances, this has always been the case in their household.  He denies issues with driving, denies ever getting lost. 62M, lives with wife, has adult children from prior marriage, adult stepchildren from current marriage, works in [employer] shop, with pphx of depression/anxiety, substance use (in remission), no prior hx of psychiatric admissions but does have hx of rehab in remote past and detox at [facility] in 2019, no prior suicide attempts, who presented to [facility] with worsening depression and fleeting thought to take more quetiapine than prescribed.    Pt states that he had ETOH and substance use issues as a young adult, but entered rehab when he was 27 years and was subsequently sober for >30 years.  Pt states he has had self esteem issues since early adulthood as most of his peers had gone on to have successful careers and families.  After rehab, he began looking for a job and got  in his 30s to his first wife, states that marriage was tumultuous, and it resulted in a stressful divorce where pt and his ex-wife battled over custody.  Pt during this time had lots of anxiety, and his PMD started sertraline with good effect.  Pt reports that custody castano over his biological daughter also ended up being stressful after court's decision for children to remain with him, as daughter indicated afterwards that she wanted to live with her mother, and so his daughter went to live in [place] while his son stayed with him, his second wife and her children.  Pt states that marriage to 2nd wife for first 10 years was very happy with much family time and many trips.  In 2013, pt began misusing pain medications that he had received after bilateral hip replacement, and while he still remained functional socially and occupationally, this did put stress on his marriage.  In 2019 pt entered detox and has been sober since.  Pt states that his biological daughter went to college in [place] and was not as supportive as he would have hoped, pt in more recent years had expectations that daughter would reciprocate effort that he was putting into their relationship. Approximately 1 year ago, daughter was supposed to visit him in [place] (he had not seen her in 4 years), but she cancelled the visit.  In addition, pt's dog became ill during this time.  Due to these stressors, pt became very depressed but also "anxious and paranoid" and did not leave his home.  Pt was brought to ER but was discharged home and he began treatment with current outpatient providers.  Medication trials continued, pt states there was improvement, however 6 weeks ago, pt began abruptly doing worse - this was in context of his daughter cancelling another visit for Father's Day.    Pt describes worsening depressed mood over last 6 weeks with anhedonia (does not want to spend time with family and has difficulty with ADLs).  He reports hypersomnia and decrease in appetite.  Pt has not been able to concentrate at work.  He has feelings of guilt/remorse regarding his daughter.  Pt denies any history of mini or psychosis.  Pt reports having recent fleeting thought of taking a handful of quetiapine, however he states this was more to sleep and attributes it to his substance use history, states he does not really believe it was suicidal thinking.  Pt denies any premeditative acts (no researching, no suicide notes, no writing mayen or giving away belongings, no saying goodbyes).  He denies any thoughts of harming others.  Pt also describes having forgetfulness over last 1 year, will sometimes forget where keys are, will walk into room and forget what he was doing, has left oven on, and once forgot to put truck in park before getting out of vehicle.  Pt states he has difficulty doing shopping and food preparation which were usually his responsibilities.  Wife does housekeeping and finances, this has always been the case in their household.  He denies issues with driving, no MVAs or getting lost.

## 2022-07-15 NOTE — BH CHART NOTE - NSEVENTNOTEFT_PSY_ALL_CORE
Spoke to wife Bossman ([phone]).  Bossman says that Avni hasn't been himself for awhile. His last good day was a month ago when he went fishing and actually enjoyed himself "then his medications changed and he deteriorated." She also states on Father's Day his daughter, who he hasn't seen in years or talked to on the phone in over 5 months, didn't call him. She texted him at 8 pm. Bossman feels that this situation has lead to Avni's worsening depression. She says Avni used to  his daughter's softball team, [place], and would give her money whenever she needed. Per Bossman, he has not Avni has been feeling worthless and has "no feelings." She states that he "can't accept life or give it." They haven't slept in the same room for 2-3 years, he doesn't give kisses, and they have not been going out recently. Bossman says that Avni has not showered for 4 days straight before coming to the hospital. Bossman has been more concerned about Avni in the last 2 weeks. She has been having a "weird feeling" about him and even his stepdaughter, Iwona, a mental health professional, told her to watch him more. Over the last week, it got worse where Avni didn't want to go out. He only wanted to sleep and if he went out he would come back and go to sleep. Then on Thursday morning, Bossman asked Avni if he was okay and he revealed to her that 2 nights before he thought to "take pills and never wake up, but he didn't have enough pills to do anything." Bossman decided to take him to the ER following Iwona's earlier advice and she called his outpatient provider who recommended he go to the ER too.   Spoke to wife Bossman ([phone]).  Bossman says that Avni hasn't been himself for awhile. Bossman states that Avni was against being admitted last year (8/21), but he should have been admitted. He was unlike himself- very nervous and distant with angry outbursts. "He was severely depressed" after their dog stopped walking and his daughter cancelled her visit last minute. Then this Father's Day, his daughter, who he hasn't seen in years or talked to on the phone in over 5 months, didn't call him. She texted him at 8 pm. She says Avni used to  his daughter's softball team, [place], and would give her money whenever she needed. Per Bossman, he won't be okay until he can come to terms with the current state of their relationship. Bossman states that Avni has been feeling worthless and has "no feelings." She states that he "can't accept life or give it." They haven't slept in the same room for 2-3 years, he doesn't give kisses, and they have not been going out recently.  She's had a "bad feeling" about him over the last 2 weeks and even his stepdaughter, Iwona, a mental health professional, told her to watch him more. Over the last week, Avni's presentation got worse where Avni didn't want to go out. He only wanted to sleep and if he went out he would come back and go to sleep. He also did not shower for 4 days straight this week. Then on Thursday morning, Bossman asked Avni if he was okay and he revealed to her that 2 nights prior he thought to "take pills and never wake up," but he didn't have enough pills to do anything. Bossman decided to take him to the ER following Iwona's earlier advice and she called his outpatient provider who recommended he go to the ER too. Per Bossman, Avni's last good day was a month ago when he went fishing and actually enjoyed himself "then his medications changed and he deteriorated."

## 2022-07-16 PROCEDURE — 99232 SBSQ HOSP IP/OBS MODERATE 35: CPT

## 2022-07-16 RX ORDER — FLUOXETINE HCL 10 MG
30 CAPSULE ORAL DAILY
Refills: 0 | Status: DISCONTINUED | OUTPATIENT
Start: 2022-07-17 | End: 2022-07-17

## 2022-07-16 RX ADMIN — Medication 40 MILLIGRAM(S): at 08:32

## 2022-07-16 RX ADMIN — CARIPRAZINE 1.5 MILLIGRAM(S): 1.5 CAPSULE, GELATIN COATED ORAL at 08:33

## 2022-07-16 RX ADMIN — QUETIAPINE FUMARATE 100 MILLIGRAM(S): 200 TABLET, FILM COATED ORAL at 21:14

## 2022-07-16 NOTE — PSYCHIATRIC REHAB INITIAL EVALUATION - NSBHALCSUBUSE_PSY_ALL_CORE
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

## 2022-07-16 NOTE — BH INPATIENT PSYCHIATRY PROGRESS NOTE - CURRENT MEDICATION
MEDICATIONS  (STANDING):  QUEtiapine 100 milliGRAM(s) Oral at bedtime    MEDICATIONS  (PRN):  acetaminophen     Tablet .. 650 milliGRAM(s) Oral every 6 hours PRN Moderate Pain (4 - 6)  hydrOXYzine hydrochloride 50 milliGRAM(s) Oral every 6 hours PRN anxiety/agitation  LORazepam   Injectable 2 milliGRAM(s) IntraMuscular once PRN agitation  polyethylene glycol 3350 17 Gram(s) Oral daily PRN constipation

## 2022-07-16 NOTE — PSYCHIATRIC REHAB INITIAL EVALUATION - NSBHPRRECOMMEND_PSY_ALL_CORE
Med list printed and left up front for .   Writer met with patient in order to orient patient to unit, provide patient with a unit schedule, and  introduce patient to psychiatric rehabilitation staff and department functions. Writer discussed current protocol in response to COVID-19; writer reviewed the importance of daily hygiene, hand-washing, and the function and importance of the mask mandate and appropriate social distancing. Patient was verbal, polite and cooperative. Patient presented to [facility] for worsening depression and thoughts of taking more quetiapine than prescribed but not in the context suicidal thoughts. Patient reported worsening depression over the past four months and contributes some reasons for worsening depression to be dog receiving injury and relationship issues. Patient denies SI/HI/AVH Patient has PPH of depression, anxiety and substance use (ETOH and pain killers, been in remission since 2019). Patient has no prior psychiatric hospitalizations, but does have history of attending rehab and detox in 2019 at [facility]. Patient denies history of SI/SA/SIB. Patient denies legal history. Patient has PMH of bilateral hip replacement (2013) and recent back injury. Writer and patient were able to collaborate on a psychiatric rehabilitation goal. Psych rehab staff will continue to engage patient daily in order to build therapeutic rapport.

## 2022-07-16 NOTE — PSYCHIATRIC REHAB INITIAL EVALUATION - NSBHALCSUBTREAT_PSY_ALL_CORE
In treatment with psychiatric NP Joao and psychiatrist Dr. Danielle at [facility]./Outpatient clinic (specify)

## 2022-07-16 NOTE — PSYCHIATRIC REHAB INITIAL EVALUATION - NSBHEMPSATISFY_PSY_ALL_CORE
Patient reports not enjoying the environment of his job (coworkers drinking at the job/rowdy coworkers)./No (specify)

## 2022-07-16 NOTE — PSYCHIATRIC REHAB INITIAL EVALUATION - NSPROEDALEARNPREF_GEN_A_NUR
Quality 130: Documentation Of Current Medications In The Medical Record: Current Medications Documented Additional Notes: Medication update Detail Level: Simple verbal instruction group instruction/individual instruction/verbal instruction/written material

## 2022-07-16 NOTE — BH INPATIENT PSYCHIATRY PROGRESS NOTE - MSE UNSTRUCTURED FT
Appearance: Dressed appropriately. Good hygiene and grooming.    Behavior: Cooperative.  Fair eye contact.   Motor: No abnormal movements, no psychomotor slowing or activation.  Speech: Regular rate.  Mood: "ok."  Affect: Depressed  Thought Process: Linear  Associations: Fair.  Thought Content: Ruminations.  No AVH, SIIP, HIIP.  Insight: Fair  Judgment: Fair  Attention: Fair.  Language: Fluent.  Gait: Intact.

## 2022-07-16 NOTE — BH INPATIENT PSYCHIATRY PROGRESS NOTE - NSBHCHARTREVIEWVS_PSY_A_CORE FT
Vital Signs Last 24 Hrs  T(C): 36.9 (07-16-22 @ 16:19), Max: 37.1 (07-16-22 @ 09:01)  T(F): 98.5 (07-16-22 @ 16:19), Max: 98.8 (07-16-22 @ 09:01)  HR: --  BP: --  BP(mean): --  RR: --  SpO2: --    Orthostatic VS  07-16-22 @ 09:01  Lying BP: --/-- HR: --  Sitting BP: 136/82 HR: 94  Standing BP: --/-- HR: --  Site: --  Mode: --  Orthostatic VS  07-15-22 @ 12:39  Lying BP: --/-- HR: --  Sitting BP: 137/90 HR: 88  Standing BP: 143/90 HR: 94  Site: --  Mode: --

## 2022-07-16 NOTE — BH INPATIENT PSYCHIATRY PROGRESS NOTE - NSBHFUPINTERVALHXFT_PSY_A_CORE
Pt states he had difficulty sleeping due to roommate.  States he has been going to groups and trying to unwind.

## 2022-07-16 NOTE — BH INPATIENT PSYCHIATRY PROGRESS NOTE - NSBHASSESSSUMMFT_PSY_ALL_CORE
62M w/pphx of depression/anxiety, admitted with depressive symptoms meeting criteria for MDE at this time.      1. Pt in agreement with ECT.  2. Discussed copay with Trintellix, pt states he can finance this medication.  Will plan for 5 mg in AM and taper Prozac to 30 mg.  Will d/c Vraylar given polypharmacy and limited efficacy.   3. Collateral.

## 2022-07-17 RX ORDER — FLUOXETINE HCL 10 MG
20 CAPSULE ORAL DAILY
Refills: 0 | Status: DISCONTINUED | OUTPATIENT
Start: 2022-07-18 | End: 2022-07-18

## 2022-07-17 RX ORDER — VORTIOXETINE 5 MG/1
5 TABLET, FILM COATED ORAL DAILY
Refills: 0 | Status: DISCONTINUED | OUTPATIENT
Start: 2022-07-17 | End: 2022-07-20

## 2022-07-17 RX ADMIN — Medication 30 MILLIGRAM(S): at 08:30

## 2022-07-17 RX ADMIN — VORTIOXETINE 5 MILLIGRAM(S): 5 TABLET, FILM COATED ORAL at 10:29

## 2022-07-17 RX ADMIN — QUETIAPINE FUMARATE 100 MILLIGRAM(S): 200 TABLET, FILM COATED ORAL at 20:36

## 2022-07-17 NOTE — BH INPATIENT PSYCHIATRY PROGRESS NOTE - NSBHASSESSSUMMFT_PSY_ALL_CORE
62M w/pphx of depression/anxiety, admitted with depressive symptoms meeting criteria for MDE at this time.      1. Pt in agreement with ECT.  2. Discussed copay with Trintellix, pt states he can finance this medication.  Began at 5 mg daily.  Taper Prozac to 20 mg.  d/c'ed Vraylar given polypharmacy and limited efficacy.   3. Collateral.

## 2022-07-17 NOTE — BH INPATIENT PSYCHIATRY PROGRESS NOTE - NSBHCHARTREVIEWVS_PSY_A_CORE FT
Vital Signs Last 24 Hrs  T(C): 36.7 (07-17-22 @ 08:11), Max: 36.9 (07-16-22 @ 16:19)  T(F): 98.1 (07-17-22 @ 08:11), Max: 98.5 (07-16-22 @ 16:19)  HR: --  BP: --  BP(mean): --  RR: --  SpO2: --    Orthostatic VS  07-17-22 @ 08:11  Lying BP: --/-- HR: --  Sitting BP: 139/85 HR: 92  Standing BP: --/-- HR: --  Site: --  Mode: --  Orthostatic VS  07-16-22 @ 09:01  Lying BP: --/-- HR: --  Sitting BP: 136/82 HR: 94  Standing BP: --/-- HR: --  Site: --  Mode: --

## 2022-07-17 NOTE — BH INPATIENT PSYCHIATRY PROGRESS NOTE - MSE UNSTRUCTURED FT
Appearance: Dressed appropriately.    Behavior: Cooperative.  Fair eye contact.   Motor: No abnormal movements, no psychomotor slowing or activation.  Speech: Regular rate.  Mood: "Anxious."  Affect: Depressed and anxious.   Thought Process: Linear  Associations: Fair.  Thought Content: Ruminations.  No AVH, SIIP, HIIP.  Insight: Fair  Judgment: Fair  Attention: Fair.  Language: Fluent.  Gait: Intact.

## 2022-07-17 NOTE — BH INPATIENT PSYCHIATRY PROGRESS NOTE - NSBHFUPINTERVALHXFT_PSY_A_CORE
Pt states he has been having loose stools but attributes to the food here.  States moods are anxious at times.

## 2022-07-17 NOTE — BH INPATIENT PSYCHIATRY PROGRESS NOTE - CURRENT MEDICATION
MEDICATIONS  (STANDING):  FLUoxetine 30 milliGRAM(s) Oral daily  QUEtiapine 100 milliGRAM(s) Oral at bedtime  vortioxetine 5 milliGRAM(s) Oral daily    MEDICATIONS  (PRN):  acetaminophen     Tablet .. 650 milliGRAM(s) Oral every 6 hours PRN Moderate Pain (4 - 6)  hydrOXYzine hydrochloride 50 milliGRAM(s) Oral every 6 hours PRN anxiety/agitation  LORazepam   Injectable 2 milliGRAM(s) IntraMuscular once PRN agitation  polyethylene glycol 3350 17 Gram(s) Oral daily PRN constipation

## 2022-07-18 PROCEDURE — 99232 SBSQ HOSP IP/OBS MODERATE 35: CPT | Mod: GC

## 2022-07-18 RX ORDER — FLUOXETINE HCL 10 MG
10 CAPSULE ORAL DAILY
Refills: 0 | Status: DISCONTINUED | OUTPATIENT
Start: 2022-07-18 | End: 2022-07-20

## 2022-07-18 RX ADMIN — VORTIOXETINE 5 MILLIGRAM(S): 5 TABLET, FILM COATED ORAL at 08:49

## 2022-07-18 RX ADMIN — QUETIAPINE FUMARATE 100 MILLIGRAM(S): 200 TABLET, FILM COATED ORAL at 21:14

## 2022-07-18 RX ADMIN — Medication 20 MILLIGRAM(S): at 08:47

## 2022-07-18 NOTE — BH INPATIENT PSYCHIATRY PROGRESS NOTE - NSBHCHARTREVIEWVS_PSY_A_CORE FT
Vital Signs Last 24 Hrs  T(C): 36.2 (07-18-22 @ 07:07), Max: 36.8 (07-17-22 @ 15:50)  T(F): 97.1 (07-18-22 @ 07:07), Max: 98.3 (07-17-22 @ 15:50)  HR: --  BP: --  BP(mean): --  RR: --  SpO2: --    Orthostatic VS  07-18-22 @ 07:07  Lying BP: --/-- HR: --  Sitting BP: 149/72 HR: 84  Standing BP: --/-- HR: --  Site: --  Mode: --  Orthostatic VS  07-17-22 @ 08:11  Lying BP: --/-- HR: --  Sitting BP: 139/85 HR: 92  Standing BP: --/-- HR: --  Site: --  Mode: --   Vital Signs Last 24 Hrs  T(C): 36.2 (07-18-22 @ 07:07), Max: 36.2 (07-18-22 @ 07:07)  T(F): 97.1 (07-18-22 @ 07:07), Max: 97.1 (07-18-22 @ 07:07)  HR: --  BP: --  BP(mean): --  RR: --  SpO2: --    Orthostatic VS  07-18-22 @ 07:07  Lying BP: --/-- HR: --  Sitting BP: 149/72 HR: 84  Standing BP: --/-- HR: --  Site: --  Mode: --  Orthostatic VS  07-17-22 @ 08:11  Lying BP: --/-- HR: --  Sitting BP: 139/85 HR: 92  Standing BP: --/-- HR: --  Site: --  Mode: --

## 2022-07-18 NOTE — BH INPATIENT PSYCHIATRY PROGRESS NOTE - NSBHMSETHTCONTENT_PSY_A_CORE
"Physical Therapy      Patient Name:  Courtney Engle   MRN:  [id number]    Patient not seen today secondary to unwilling to participate and pain x2 attempts. Pt states 6/10 B knee pain, therapist asked if pt wanted pain medications and do therapy later, pt refused. RN notified. Pt states "I think I over did it yesterday" and requested to rest for today. Will follow-up as schedule per PT POC.    04/06/2022          " Other

## 2022-07-18 NOTE — BH INPATIENT PSYCHIATRY PROGRESS NOTE - NSBHASSESSSUMMFT_PSY_ALL_CORE
62M, lives with wife, has adult children from prior marriage, adult stepchildren from current marriage, works in [employer] shop, with pphx of depression/anxiety, substance use (in remission), no prior hx of psychiatric admissions but does have hx of rehab in remote past and detox at [facility] in 2019, no prior suicide attempts, who presented to [facility] with worsening depression and fleeting thought to take more quetiapine than prescribed. Admitted for Major Depressive Disorder.     Today, patient is improved from admission. Appearing more hopeful about his future and motivated to recover. Will continue tapering the fluoxetine and continue vortioxetine and quetiapine. Will place an ECT consult.     Plan:  1.	Legals: continue on 9.13 status  2.	Safety: routine observation, denies SI/HI/I/P on the unit. Haldol 5 mg/Ativan 2 mg/Benadryl 50 mg PO/IM PRN for agitation   3.	Psychiatric: Fluoxetine 10 mg qd, Vortioxetine 5 mg qd, Quetiapine 100 mg qHS. ECT consult  4.	Group, milieu, individual therapy as appropriate.  5.	Medical: no acute medical issues, no significant PMH.  Admission labs WNL.  6.	Dispo: pending clinical improvement.  Patient continues to require inpatient hospitalization for stabilization and safety.

## 2022-07-18 NOTE — BH INPATIENT PSYCHIATRY PROGRESS NOTE - NSBHFUPINTERVALHXFT_PSY_A_CORE
Chart reviewed. Case d/w interdisciplinary team. Patient seen and examined. No acute overnight events, no PRNs required/requested. Compliant with standing medications. Patient doesn't like the hospital food, but reports having an appetite. Although he reports poor sleep secondary to his roommate snoring and moving in his sleep, patient denies desire to stay in bed during the day. States that he's trying to "fit in," attending groups and mingling with his peers. Denies passive or active SI, intent, or plan. Denies AVH.

## 2022-07-18 NOTE — BH INPATIENT PSYCHIATRY PROGRESS NOTE - CURRENT MEDICATION
MEDICATIONS  (STANDING):  FLUoxetine 10 milliGRAM(s) Oral daily  QUEtiapine 100 milliGRAM(s) Oral at bedtime  vortioxetine 5 milliGRAM(s) Oral daily    MEDICATIONS  (PRN):  acetaminophen     Tablet .. 650 milliGRAM(s) Oral every 6 hours PRN Moderate Pain (4 - 6)  hydrOXYzine hydrochloride 50 milliGRAM(s) Oral every 6 hours PRN anxiety/agitation  LORazepam   Injectable 2 milliGRAM(s) IntraMuscular once PRN agitation  polyethylene glycol 3350 17 Gram(s) Oral daily PRN constipation

## 2022-07-18 NOTE — BH INPATIENT PSYCHIATRY PROGRESS NOTE - NSBHATTESTCOMMENTATTENDFT_PSY_A_CORE
Pt continues to have depression/anxiety.  Tapering fluoxetine.  Trintellix initiated.  Will plan for slow titration given hx of side effects with psychotropics.  ECT consult.

## 2022-07-19 PROCEDURE — 99232 SBSQ HOSP IP/OBS MODERATE 35: CPT | Mod: GC

## 2022-07-19 PROCEDURE — 90832 PSYTX W PT 30 MINUTES: CPT

## 2022-07-19 RX ADMIN — QUETIAPINE FUMARATE 100 MILLIGRAM(S): 200 TABLET, FILM COATED ORAL at 20:34

## 2022-07-19 RX ADMIN — Medication 10 MILLIGRAM(S): at 08:15

## 2022-07-19 RX ADMIN — VORTIOXETINE 5 MILLIGRAM(S): 5 TABLET, FILM COATED ORAL at 08:15

## 2022-07-19 NOTE — BH INPATIENT PSYCHIATRY PROGRESS NOTE - MSE UNSTRUCTURED FT
Appearance: Dressed appropriately.    Behavior: Cooperative. Fair eye contact.   Motor: No abnormal movements, no psychomotor slowing or activation.  Speech: normal rate, volume and tone.  Mood: "good."  Affect: neutral but depressed while discussing upcoming ECT treatment. constricted. stable. normal expressivity.   Thought Process: Linear  Associations: Fair.  Thought Content: Ruminations. SIIP, HIIP.  Perception:  No AVH.  Insight: Fair  Judgment: Fair  Attention: Fair.  Language: Fluent.  Gait: Intact.

## 2022-07-19 NOTE — BH SOCIAL WORK INITIAL PSYCHOSOCIAL EVALUATION - NSPTSTATEDGOAL_PSY_ALL_CORE
"I want to re-connect with my daughter, start journaling to decrease procrastination, play an instrument and get active."

## 2022-07-19 NOTE — ECT CONSULT NOTE - NSECTMENTALSTATUSEXAM_PSY_ALL_CORE
Appearance: Awake and alert, appearing stated age, in no acute distress   Behavior: Appropriate facial expressions and eye contact, no motor/gait abnormalities observed   Speech: Spontaneous speech fluent with normal rate/volume/rhythm/intonation   Attitude: Cooperative with interviewer and engaged during interview   Mood: “I'm feeling better”   Affect:  full  Thought Process: Linear, coherent, and goal-directed   Thought Content:        -Delusions- None elicited        -Suicidal ideation- Denies        -Homicidal ideation- Denies   Perception: Denies AVH, not IP/RtIS   Insight: Fair  Judgment: Fair

## 2022-07-19 NOTE — BH INPATIENT PSYCHIATRY PROGRESS NOTE - NSBHATTESTCOMMENTATTENDFT_PSY_A_CORE
Pt continues to be engaged in milieu.  Cross taper from fluoxetine to vortioxetine continues.  ECT consult.  ECT #1 likely tomorrow.

## 2022-07-19 NOTE — ECT CONSULT NOTE - NSECTASSESSRECOMM_PSY_ALL_CORE
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    hx and exam remarkable for worsening sx of depression for past 6 wks when daughter did not come visit for fathers day.  since admission sx have been improving w/ pharmacological intervention.  pt now reports only mild low mood, and mild neurovegetative sx.  given improvement w/ pharmacological intervention ECT not indicated at this time.  If pt sx worsen re-consult.    PLAN:  -ECT not indicated given pt preference given sx improvement w/ pharmacological mgmt  -if sx worsen please re-consult as pt would be a good candidate.

## 2022-07-19 NOTE — BH SOCIAL WORK INITIAL PSYCHOSOCIAL EVALUATION - OTHER PAST PSYCHIATRIC HISTORY (INCLUDE DETAILS REGARDING ONSET, COURSE OF ILLNESS, INPATIENT/OUTPATIENT TREATMENT)
As per  Inpatient Psychiatry Assessment Note on 7/15/22: "62M, lives with wife, has adult children from prior marriage, adult stepchildren from current marriage, works in [employer] shop, with pphx of depression/anxiety, substance use (in remission), no prior hx of psychiatric admissions but does have hx of rehab in remote past and detox at [facility] in 2019, no prior suicide attempts, who presented to [facility] with worsening depression and fleeting thought to take more quetiapine than prescribed."

## 2022-07-19 NOTE — BH INPATIENT PSYCHIATRY PROGRESS NOTE - NSBHCHARTREVIEWVS_PSY_A_CORE FT
Vital Signs Last 24 Hrs  T(C): 36.6 (07-19-22 @ 07:59), Max: 36.6 (07-19-22 @ 07:59)  T(F): 97.9 (07-19-22 @ 07:59), Max: 97.9 (07-19-22 @ 07:59)  HR: --  BP: 125/55 (07-19-22 @ 07:59) (125/55 - 125/55)  BP(mean): 78 (07-19-22 @ 07:59) (78 - 78)  RR: --  SpO2: --    Orthostatic VS  07-18-22 @ 07:07  Lying BP: --/-- HR: --  Sitting BP: 149/72 HR: 84  Standing BP: --/-- HR: --  Site: --  Mode: --

## 2022-07-19 NOTE — BH INPATIENT PSYCHIATRY PROGRESS NOTE - NSBHFUPINTERVALHXFT_PSY_A_CORE
Chart reviewed. Case d/w interdisciplinary team. Patient seen and examined. No acute overnight events, no PRNs required/requested. Compliant with standing medications. Slept better overnight after room change. Reports fair appetite. Describes that he has been reflecting on his relationship with his daughter, and he has realized that he has to be less stubborn as the parent. Denies passive or active SI, intent, or plan. Denies AVH.

## 2022-07-19 NOTE — BH SOCIAL WORK INITIAL PSYCHOSOCIAL EVALUATION - NSBHHOUSECOMMENTFT_PSY_ALL_CORE
Pt resides with his wife, adult son, Tyree and step daughter, Iwona.     Pt also has a dtr, Patel who he is somewhat estranged from- she lives in [place].

## 2022-07-19 NOTE — BH INPATIENT PSYCHIATRY PROGRESS NOTE - NSBHASSESSSUMMFT_PSY_ALL_CORE
62M, lives with wife, has adult children from prior marriage, adult stepchildren from current marriage, works in [employer] shop, with pphx of depression/anxiety, substance use (in remission), no prior hx of psychiatric admissions but does have hx of rehab in remote past and detox at [facility] in 2019, no prior suicide attempts, who presented to [facility] with worsening depression and fleeting thought to take more quetiapine than prescribed. Admitted for Major Depressive Disorder.     Today, patient is improved from admission. Appearing more hopeful and motivated, but with an underlying depressed affect. Will continue tapering the fluoxetine and continue vortioxetine and quetiapine. Waiting ECT consult.     Plan:  1.	Legals: continue on 9.13 status  2.	Safety: routine observation, denies SI/HI/I/P on the unit. Haldol 5 mg/Ativan 2 mg/Benadryl 50 mg PO/IM PRN for agitation   3.	Psychiatric: Fluoxetine 10 mg qd, Vortioxetine 5 mg qd, Quetiapine 100 mg qHS. ECT consult  4.	Group, milieu, individual therapy as appropriate.  5.	Medical: no acute medical issues, no significant PMH.  Admission labs WNL.  6.	Dispo: pending clinical improvement.  Patient continues to require inpatient hospitalization for stabilization and safety.

## 2022-07-19 NOTE — ECT CONSULT NOTE - OTHER PAST PSYCHIATRIC HISTORY (INCLUDE DETAILS REGARDING ONSET, COURSE OF ILLNESS, INPATIENT/OUTPATIENT TREATMENT)
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, psych h/o MDD, anxiety, no PPH but 2 rehab stays most recent 2019, no known SA/NSSIB/abuse/violence/legal, prior etoh/opiate abuse but sober since 2019, PMH disc herniations, bilat hip replacement, presenting for worsening depression, ECT consulted for sx severity/tx-resistance    HPI:  as per ID:  "62M, lives with wife, has adult children from prior marriage, adult stepchildren from current marriage, works in [employer] shop, with pphx of depression/anxiety, substance use (in remission), no prior hx of psychiatric admissions but does have hx of rehab in remote past and detox at [facility] in 2019, no prior suicide attempts, who presented to [facility] with worsening depression and fleeting thought to take more quetiapine than prescribed.    Pt states that he had ETOH and substance use issues as a young adult, but entered rehab when he was 27 years and was subsequently sober for >30 years.  Pt states he has had self esteem issues since early adulthood as most of his peers had gone on to have successful careers and families.  After rehab, he began looking for a job and got  in his 30s to his first wife, states that marriage was tumultuous, and it resulted in a stressful divorce where pt and his ex-wife battled over custody.  Pt during this time had lots of anxiety, and his PMD started sertraline with good effect.  Pt reports that custody castano over his biological daughter also ended up being stressful after court's decision for children to remain with him, as daughter indicated afterwards that she wanted to live with her mother, and so his daughter went to live in [place] while his son stayed with him, his second wife and her children.  Pt states that marriage to 2nd wife for first 10 years was very happy with much family time and many trips.  In 2013, pt began misusing pain medications that he had received after bilateral hip replacement, and while he still remained functional socially and occupationally, this did put stress on his marriage.  In 2019 pt entered detox and has been sober since.  Pt states that his biological daughter went to college in [place] and was not as supportive as he would have hoped, pt in more recent years had expectations that daughter would reciprocate effort that he was putting into their relationship. Approximately 1 year ago, daughter was supposed to visit him in [place] (he had not seen her in 4 years), but she cancelled the visit.  In addition, pt's dog became ill during this time.  Due to these stressors, pt became very depressed but also "anxious and paranoid" and did not leave his home.  Pt was brought to ER but was discharged home and he began treatment with current outpatient providers.  Medication trials continued, pt states there was improvement, however 6 weeks ago, pt began abruptly doing worse - this was in context of his daughter cancelling another visit for Father's Day.    Pt describes worsening depressed mood over last 6 weeks with anhedonia (does not want to spend time with family and has difficulty with ADLs).  He reports hypersomnia and decrease in appetite.  Pt has not been able to concentrate at work.  He has feelings of guilt/remorse regarding his daughter.  Pt denies any history of mini or psychosis.  Pt reports having recent fleeting thought of taking a handful of quetiapine, however he states this was more to sleep and attributes it to his substance use history, states he does not really believe it was suicidal thinking.  Pt denies any premeditative acts (no researching, no suicide notes, no writing mayen or giving away belongings, no saying goodbyes).  He denies any thoughts of harming others.  Pt also describes having forgetfulness over last 1 year, will sometimes forget where keys are, will walk into room and forget what he was doing, has left oven on, and once forgot to put truck in park before getting out of vehicle.  Pt states he has difficulty doing shopping and food preparation which were usually his responsibilities.  Wife does housekeeping and finances, this has always been the case in their household.  He denies issues with driving, no MVAs or getting lost."    on my interview pt reports sx of depression sig improving since admission.  states that groups and medications have helped significantly.  feels 50-60% better.  now w/ residual mild low mood, decreased appetite, initial insomnia.  appetite changes, SI, concentration deficits all have resolved.  no manic sx/psychotic sx. after lengthy discussion w/ pt he has elected to defer ECT given improvement w/ medication

## 2022-07-20 PROCEDURE — 90834 PSYTX W PT 45 MINUTES: CPT

## 2022-07-20 PROCEDURE — 99232 SBSQ HOSP IP/OBS MODERATE 35: CPT

## 2022-07-20 RX ORDER — QUETIAPINE FUMARATE 200 MG/1
1 TABLET, FILM COATED ORAL
Qty: 14 | Refills: 0
Start: 2022-07-20 | End: 2022-08-02

## 2022-07-20 RX ORDER — VORTIOXETINE 5 MG/1
1 TABLET, FILM COATED ORAL
Qty: 14 | Refills: 0
Start: 2022-07-20 | End: 2022-08-02

## 2022-07-20 RX ORDER — VORTIOXETINE 5 MG/1
1 TABLET, FILM COATED ORAL
Qty: 30 | Refills: 0
Start: 2022-07-20 | End: 2022-08-18

## 2022-07-20 RX ORDER — VORTIOXETINE 5 MG/1
10 TABLET, FILM COATED ORAL DAILY
Refills: 0 | Status: DISCONTINUED | OUTPATIENT
Start: 2022-07-21 | End: 2022-07-21

## 2022-07-20 RX ADMIN — QUETIAPINE FUMARATE 100 MILLIGRAM(S): 200 TABLET, FILM COATED ORAL at 20:50

## 2022-07-20 RX ADMIN — Medication 10 MILLIGRAM(S): at 08:32

## 2022-07-20 RX ADMIN — VORTIOXETINE 5 MILLIGRAM(S): 5 TABLET, FILM COATED ORAL at 08:32

## 2022-07-20 NOTE — BH INPATIENT PSYCHIATRY PROGRESS NOTE - CURRENT MEDICATION
MEDICATIONS  (STANDING):  FLUoxetine 10 milliGRAM(s) Oral daily  QUEtiapine 100 milliGRAM(s) Oral at bedtime    MEDICATIONS  (PRN):  acetaminophen     Tablet .. 650 milliGRAM(s) Oral every 6 hours PRN Moderate Pain (4 - 6)  hydrOXYzine hydrochloride 50 milliGRAM(s) Oral every 6 hours PRN anxiety/agitation  LORazepam   Injectable 2 milliGRAM(s) IntraMuscular once PRN agitation  polyethylene glycol 3350 17 Gram(s) Oral daily PRN constipation   MEDICATIONS  (STANDING):  QUEtiapine 100 milliGRAM(s) Oral at bedtime    MEDICATIONS  (PRN):  acetaminophen     Tablet .. 650 milliGRAM(s) Oral every 6 hours PRN Moderate Pain (4 - 6)  hydrOXYzine hydrochloride 50 milliGRAM(s) Oral every 6 hours PRN anxiety/agitation  LORazepam   Injectable 2 milliGRAM(s) IntraMuscular once PRN agitation  polyethylene glycol 3350 17 Gram(s) Oral daily PRN constipation

## 2022-07-20 NOTE — BH INPATIENT PSYCHIATRY PROGRESS NOTE - NSBHCHARTREVIEWVS_PSY_A_CORE FT
Vital Signs Last 24 Hrs  T(C): 36.7 (07-20-22 @ 07:52), Max: 36.7 (07-20-22 @ 07:52)  T(F): 98 (07-20-22 @ 07:52), Max: 98 (07-20-22 @ 07:52)  HR: --  BP: --  BP(mean): --  RR: --  SpO2: --    Orthostatic VS  07-20-22 @ 07:52  Lying BP: --/-- HR: --  Sitting BP: 147/81 HR: 79  Standing BP: --/-- HR: --  Site: --  Mode: --

## 2022-07-20 NOTE — BH PSYCHOLOGY - CLINICIAN PSYCHOTHERAPY NOTE - NSBHPSYCHOLGOALS_PSY_A_CORE FT
Decrease symptoms, Develop coping/emotion regulation skills, Increase value-based action, Psychoeducation 
Decrease symptoms, Develop coping/emotion regulation skills, Increase value-based action, Psychoeducation

## 2022-07-20 NOTE — BH DISCHARGE NOTE NURSING/SOCIAL WORK/PSYCH REHAB - NSBHREFERPURPOSE2_PSY_ALL_CORE
Mental Health Treatment Please note, you have to complete the intake paperwork that was sent to you prior to this appt. Thank you./Mental Health Treatment

## 2022-07-20 NOTE — BH INPATIENT PSYCHIATRY DISCHARGE NOTE - NSDCMRMEDTOKEN_GEN_ALL_CORE_FT
QUEtiapine 100 mg oral tablet: 1 tab(s) orally once a day (at bedtime)  vortioxetine 10 mg oral tablet: 1 tab(s) orally once a day

## 2022-07-20 NOTE — BH INPATIENT PSYCHIATRY PROGRESS NOTE - NSBHASSESSSUMMFT_PSY_ALL_CORE
62M, lives with wife, has adult children from prior marriage, adult stepchildren from current marriage, works in [employer] shop, with pphx of depression/anxiety, substance use (in remission), no prior hx of psychiatric admissions but does have hx of rehab in remote past and detox at [facility] in 2019, no prior suicide attempts, who presented to [facility] with worsening depression and fleeting thought to take more quetiapine than prescribed. Admitted for Major Depressive Disorder.     Today, patient is improved from admission. Appearing more hopeful and motivated, but with an underlying depressed affect. Will continue tapering the fluoxetine and continue vortioxetine and quetiapine. Waiting ECT consult.     Plan:  1.	Legals: continue on 9.13 status  2.	Safety: routine observation, denies SI/HI/I/P on the unit. Haldol 5 mg/Ativan 2 mg/Benadryl 50 mg PO/IM PRN for agitation   3.	Psychiatric: Fluoxetine 10 mg qd, Vortioxetine 5 mg qd, Quetiapine 100 mg qHS. ECT consult  4.	Group, milieu, individual therapy as appropriate.  5.	Medical: no acute medical issues, no significant PMH.  Admission labs WNL.  6.	Dispo: pending clinical improvement.  Patient continues to require inpatient hospitalization for stabilization and safety.   62M, lives with wife, has adult children from prior marriage, adult stepchildren from current marriage, works in [employer] shop, with pphx of depression/anxiety, substance use (in remission), no prior hx of psychiatric admissions but does have hx of rehab in remote past and detox at [facility] in 2019, no prior suicide attempts, who presented to [facility] with worsening depression and fleeting thought to take more quetiapine than prescribed. Admitted for Major Depressive Disorder.     Today, patient is improved from admission. Appearing more hopeful and motivated, but with an underlying depressed affect. Will discontinue fluoxetine, uptitrate the vortioxetine and continue quetiapine. Will plan for discharge later this week.     Plan:  1.	Legals: continue on 9.13 status  2.	Safety: routine observation, denies SI/HI/I/P on the unit. Haldol 5 mg/Ativan 2 mg/Benadryl 50 mg PO/IM PRN for agitation   3.	Psychiatric: Vortioxetine 10 mg qd, Quetiapine 100 mg qHS.  4.	Group, milieu, individual therapy as appropriate.  5.	Medical: no acute medical issues, no significant PMH. Admission labs WNL.  6.	Dispo: pending clinical improvement.  Patient continues to require inpatient hospitalization for stabilization and safety.

## 2022-07-20 NOTE — BH INPATIENT PSYCHIATRY PROGRESS NOTE - NSBHATTESTCOMMENTATTENDFT_PSY_A_CORE
Pt appears to be brighter, more linear in TP, good behavioral control, socializing, attending groups, socializing with peers.  Pt deemed not a candidate for ECT as per consult note.  Will raise Trintellix to 10 mg tomorrow, discontinue fluoxetine today.  Dispo: Pt requesting to return to previous providers, declines PHP option.

## 2022-07-20 NOTE — BH PSYCHOLOGY - CLINICIAN PSYCHOTHERAPY NOTE - NSBHPSYCHOLNARRATIVE_PSY_A_CORE FT
Engaged in initial therapy session. Established therapeutic rapport and focused on pt's reflections since initial admission. Pt reported adopting a new perspective since arriving on the unit ("a lot of people have it much worse than me"). He reflected that he would like to show more gratitude for the people who are present in his life. He disclosed that he withdrew from activities he used to enjoy over the past several months due to lack of motivation and anhedonia. He recognized that he has been more absent from his life than he would like and talked about approaching his daughter about a longstanding issue and returning to activities that bring him pleasure (fishing, cooking, walking with his life). He appears to be more accepting of his current situation and would like to engage more meaningfully in his life upon discharge. He shared that "this is good, it's getting me used to talking again." Provided validation, support, and reflective listening.    Patient participated in psychotherapy session. Patient presented with adequate grooming and was casually dressed. Patient maintained appropriate eye contact and demonstrated a cooperative attitude. No abnormal motor movements noted. Patient's mood was euthymic with constricted and reactive affect. Speech was within normal limits. No perceptual disturbances noted or observed. Patient's thought process was linear and coherent. Patient's thought content was significant for future orientation and changes pt plans to make in his life. No elicited suicidal ideation/intent/plan. No HI endorsed. Insight and judgement are good.  
Engaged in therapy session. Pt is scheduled for discharge tomorrow. Session focused on pt's improvements since admission last week and his plans following discharge. Pt expressed a great deal of gratitude for his supportive family and his community (including AA). Pt able to make the link between his withdrawal behaviors (in the 2 months leading to this admission) and his feelings of frustration/rejection regarding the state of his relationship with his daughter in [place]> Pt stated that he has "one goal" upon discharge - reaching out to his daughter to (1) find out the reasons for their drifting apart and (2) recognize and express his contribution to their dynamic. Pt stated he wants to know he took action and will ultimately come to terms with their relationship ("I can't control the outcome"). Explored the importance of taking values based actions in his life (self-care, fishing, spending time with family). Pt stated he feels much improved in terms of his outlook and mood. Provided validation, support, and consolidation of treatment gains.    Patient participated in psychotherapy session. Patient presented with adequate grooming and was casually dressed. Patient maintained appropriate eye contact and demonstrated a cooperative attitude. No abnormal motor movements noted. Patient's mood was euthymic with constricted and reactive affect. Speech was within normal limits. No perceptual disturbances noted or observed. Patient's thought process was linear and coherent. Patient's thought content was significant for future orientation and changes pt plans to make in his life. No elicited suicidal ideation/intent/plan. No HI endorsed. Insight and judgement are good.

## 2022-07-20 NOTE — BH DISCHARGE NOTE NURSING/SOCIAL WORK/PSYCH REHAB - NSCDUDCCRISIS_PSY_A_CORE
[facility] Well  1 [phone]) [facility]-WELL ([phone])  Text "WELL" to 80777  Website: www."[employer]/.[employer] 1 (107) 051-KCKV (4667) Website: www.safehorizon.org/.National Suicide Prevention Lifeline 0 (230) 999-0909/.  [employer]  1 (210) LIFENET (052-1328)/.  [facility]  [address] [place] [phone]   Hours:  Monday through Friday from 9 AM to 3 PM/.  [employer] of  Aff[employer] - Veterans Crisis Line  [phone], Option 1

## 2022-07-20 NOTE — BH INPATIENT PSYCHIATRY PROGRESS NOTE - NSBHFUPINTERVALHXFT_PSY_A_CORE
Chart reviewed. Case d/w interdisciplinary team. Patient seen and examined. No acute overnight events, no PRNs required/requested. Compliant with standing medications. Slept better overnight. Reports fair appetite. States that he's looking forward to cooking, fishing, and working in his garden again once he leaves. Describes that he's continuing to reflect on his situation and feels very grateful since he's been on the unit. Denies passive or active SI, intent, or plan. Denies AVH.  Chart reviewed. Case d/w interdisciplinary team. Patient seen and examined. No acute overnight events, no PRNs required/requested. ECT not recommend at this time per consult notes. Patient is compliant with standing medications. Slept better overnight. Reports fair appetite. States that he's looking forward to cooking, fishing, and working in his garden once he leaves. Describes that he's continuing to reflect on his situation and feels very grateful since he's been on the unit. Denies passive or active SI, intent, or plan. Denies AVH.

## 2022-07-20 NOTE — BH DISCHARGE NOTE NURSING/SOCIAL WORK/PSYCH REHAB - PATIENT PORTAL LINK FT
You can access the FollowMyHealth Patient Portal offered by [facility] by registering at the following website: [email or web address]. By joining [employer]’s FollowMyHealth portal, you will also be able to view your health information using other applications (apps) compatible with our system.

## 2022-07-20 NOTE — BH INPATIENT PSYCHIATRY DISCHARGE NOTE - OTHER PAST PSYCHIATRIC HISTORY (INCLUDE DETAILS REGARDING ONSET, COURSE OF ILLNESS, INPATIENT/OUTPATIENT TREATMENT)
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 He was brought to the ER in 8/21 for "paranoia that something bad will happen and an inability to go to work" in the context of Adderall discontinuation 2 weeks prior.  Patient was not admitted, but [facility] to a nurse practitioner at Health [facility]. Patient has been seeing a therapist through [facility].    n my interview pt reports sx of depression sig improving since admission.  states that groups and medications have helped significantly.  feels 50-60% better.  now w/ residual mild low mood, decreased appetite, initial insomnia.  appetite changes, SI, concentration deficits all have resolved.  no manic sx/psychotic sx. after lengthy discussion w/ pt he has elected to defer ECT given improvement w/ medication Per crisis center note, patient was initially prescribed Zoloft for depression in 2004 after his divorce but stopped it in 2019 and resumed in February '21. He was brought to the ER in 8/21 for "paranoia that something bad will happen and an inability to go to work" in the context of Adderall discontinuation 2 weeks prior.  Patient was not admitted, but connected to a nurse practitioner, Nat Vazquez, at [facility]. Patient has been seeing a therapist, Maryjane, through [facility]. Per crisis center note, patient was initially prescribed Zoloft for depression in 2004 after his divorce but stopped it in 2019 and resumed in February '21. He was brought to the ER in 8/21 for "paranoia that something bad will happen and an inability to go to work" in the context of Adderall discontinuation 2 weeks prior.  Patient was not admitted, but connected to a nurse practitioner, Nat Starr, at [facility]. Patient has been seeing a therapist, Maryjane, through [facility]. Hx of depression/anxiety.  1 prior admission to [facility].  Outpatient care at [facility] with NP Nat Starr, therapist Maryjane via [facility].  Hx of med trials: sertraline, bupropion (had lethargy), venlafaxine (had "electric jolts"), aripiprazole (had agitation), Adderall, cariprazine.

## 2022-07-20 NOTE — BH INPATIENT PSYCHIATRY DISCHARGE NOTE - DESCRIPTION
Lives with wife.  Has adult children from previous marriage, and adult stepchildren.  Works in [employer] shop.

## 2022-07-20 NOTE — BH PSYCHOLOGY - CLINICIAN PSYCHOTHERAPY NOTE - NSBHPSYCHOLINT_PSY_A_CORE FT
Acceptance & commitment therapy, Emotion regulation/coping skills taught, Psychoeducation 
Acceptance & commitment therapy, Emotion regulation/coping skills taught, Psychoeducation

## 2022-07-20 NOTE — BH INPATIENT PSYCHIATRY DISCHARGE NOTE - NSDCCPCAREPLAN_GEN_ALL_CORE_FT
PRINCIPAL DISCHARGE DIAGNOSIS  Diagnosis: Major depressive disorder  Assessment and Plan of Treatment:        PRINCIPAL DISCHARGE DIAGNOSIS  Diagnosis: Major depressive disorder  Assessment and Plan of Treatment: Medication management and psychotherapy

## 2022-07-20 NOTE — BH DISCHARGE NOTE NURSING/SOCIAL WORK/PSYCH REHAB - NSDCPRGOAL_PSY_ALL_CORE
Patient has demonstrated progress towards psychiatric rehabilitation goals during current hospitalization. Patient has demonstrated daily medication compliance and is tolerating medications well. Patient reports improvement in depressive symptoms compared to admission. Patient was able to identify thinking about happy times in his life and exercise as effective coping skills to manage symptoms. Patient reports feeling confident in ability to utilize coping skills post discharge. Patient has attended approximately 81 percent of psychiatric rehabilitation groups during current hospitalization. Patient was verbal and engaged in group discussions and activities. Patient was able to tolerate group structure and did not require redirection. Patient was visible in the milieu. Patient increasingly socialized with select peers appropriately. Patient was able to make needs known to staff. Patient has demonstrated improved insight into the current episode and was able to identify isolation/withdrawing from life, not talking and feelings of depression as warning signs that a crisis may be developing. Patient expressed readiness for discharge. Patient was provided with a Press Ganey survey to complete prior to discharge.

## 2022-07-20 NOTE — BH INPATIENT PSYCHIATRY DISCHARGE NOTE - HPI (INCLUDE ILLNESS QUALITY, SEVERITY, DURATION, TIMING, CONTEXT, MODIFYING FACTORS, ASSOCIATED SIGNS AND SYMPTOMS)
62M, lives with wife, has adult children from prior marriage, adult stepchildren from current marriage, works in [email or web address] shop, with pphx of depression/anxiety, substance use (in remission), no prior hx of psychiatric admissions but does have hx of rehab in remote past and detox at [facility] in 2019, no prior suicide attempts, who presented to [facility] with worsening depression and fleeting thought to take more quetiapine than prescribed.    Pt states that he had ETOH and substance use issues as a young adult, but entered rehab when he was 27 years and was subsequently sober for >30 years.  Pt states he has had self esteem issues since early adulthood as most of his peers had gone on to have successful careers and families.  After rehab, he began looking for a job and got  in his 30s to his first wife, states that marriage was tumultuous, and it resulted in a stressful divorce where pt and his ex-wife battled over custody.  Pt during this time had lots of anxiety, and his PMD started sertraline with good effect.  Pt reports that custody castano over his biological daughter also ended up being stressful after court's decision for children to remain with him, as daughter indicated afterwards that she wanted to live with her mother, and so his daughter went to live in [place] while his son stayed with him, his second wife and her children.  Pt states that marriage to 2nd wife for first 10 years was very happy with much family time and many trips.  In 2013, pt began misusing pain medications that he had received after bilateral hip replacement, and while he still remained functional socially and occupationally, this did put stress on his marriage.  In 2019 pt entered detox and has been sober since.  Pt states that his biological daughter went to college in [place] and was not as supportive as he would have hoped, pt in more recent years had expectations that daughter would reciprocate effort that he was putting into their relationship. Approximately 1 year ago, daughter was supposed to visit him in [place] (he had not seen her in 4 years), but she cancelled the visit.  In addition, pt's dog became ill during this time.  Due to these stressors, pt became very depressed but also "anxious and paranoid" and did not leave his home.  Pt was brought to ER but was discharged home and he began treatment with current outpatient providers.  Medication trials continued, pt states there was improvement, however 6 weeks ago, pt began abruptly doing worse - this was in context of his daughter cancelling another visit for Father's Day.    Pt describes worsening depressed mood over last 6 weeks with anhedonia (does not want to spend time with family and has difficulty with ADLs).  He reports hypersomnia and decrease in appetite.  Pt has not been able to concentrate at work. He has feelings of guilt/remorse regarding his daughter.  Pt denies any history of mini or psychosis.  Pt reports having recent fleeting thought of taking a handful of quetiapine, however he states this was more to sleep and attributes it to his substance use history, states he does not really believe it was suicidal thinking. Pt denies any premeditative acts (no researching, no suicide notes, no writing mayen or giving away belongings, no saying goodbyes). He denies any thoughts of harming others. Pt also describes having forgetfulness over last 1 year, will sometimes forget where keys are, will walk into room and forget what he was doing, has left oven on, and once forgot to put truck in park before getting out of vehicle.  Pt states he has difficulty doing shopping and food preparation which were usually his responsibilities. Wife does housekeeping and finances, this has always been the case in their household.  He denies issues with driving, no MVAs or getting lost.

## 2022-07-20 NOTE — BH INPATIENT PSYCHIATRY DISCHARGE NOTE - ATTENDING DISCHARGE PHYSICAL EXAMINATION:
Pt is 62M, lives with wife and stepchildren, employed, w/pphx of depression and anxiety, 1 prior psych admit, hx of ETOH use disorder, admitted with depression and anxiety in context of psychosocial stressors.  Pt indicated that recent fleeting thoughts of overusing medications though with unclear intent.  Etiology of presentation was likely MDD but could also possibly include persistent depressive disorder.  Cariprazine was discontinued due to inefficacy.  Fluoxetine was tapered and discontinued, with titration of vortioxetine to 10 mg daily.  Pt continued to receive home medication of quetiapine.  On this regimen, pt began to report improvement in depressive symptoms, family concurred that he was doing much better.  ECT was considered but was not pursued as pt had exhibited improvement.  Pt was visible on unit, attended groups and individual therapy, compliant with meds and basic care, social with peers.  He consistently denied suicidality and homicidality, and was caring for ADLs independently.  Pt was future oriented.  Pt declined PHP option for discharge.  He was discharged home with outpatient follow up.    Risk assessment on discharge: Pt has chronic risk factors of depression/anxiety, prior admission, hx of ETOH use disorder, with acute risk factors of recent depression, now treated with inpatient care.  Protective factors of supportive wife, has stepchildren and own children, employed, future oriented, therapeutic alliances, stable housing.  Pt has consistently denied suicidality and homicidality, is caring for ADLs independently.  Pt is moderate to high CHRONIC risk of harm, but is NO longer an acute or imminent risk of harm to self or others, can be discharged with outpatient follow up.

## 2022-07-20 NOTE — BH INPATIENT PSYCHIATRY DISCHARGE NOTE - NSBHMETABOLIC_PSY_ALL_CORE_FT
BMI: BMI (kg/m2): 28.7 (07-15-22 @ 12:39)  HbA1c:   Glucose:   BP: 125/55 (07-19-22 @ 07:59) (125/55 - 125/55)  Lipid Panel:

## 2022-07-20 NOTE — BH INPATIENT PSYCHIATRY DISCHARGE NOTE - HOSPITAL COURSE
Patient initially presents Patient appeared depressed on the initial interview with reports of anhedonia (no longer enjoying fishing,, hypersomnia, decreased appetite, decreased concentration, and feelings of guilt. He reported a fleeting thought of taking a handful of quetiapine with the intention of sleeping not to harm himself. These symptoms worsened in the last 6 weeks, but have been present for at least the last year in the context of acute and chronic psychosocial stressors. Patient did not express symptoms of mini or psychosis.    Per collateral (wife, Bossman), patient hadn't been himself for awhile but Bossman states that Avni was against being admitted last year (8/21), but he should have been admitted. He was unlike himself- very nervous and distant with angry outbursts. "He was severely depressed" after their dog stopped walking and his daughter cancelled her visit last minute. Bossman states that Avni has been feeling worthless and has "no feelings." She states that he "can't accept life or give it." They haven't slept in the same room for 2-3 years, he doesn't give kisses, and they have not been going out recently. She's had a "bad feeling" about him over the last 2 weeks and even his stepdaughter, Iwona, a mental health professional, told her to watch him more. Over the last week, Avni's presentation got worse where Avni didn't want to go out. He only wanted to sleep and if he went out he would come back and go to sleep. He also did not shower for 4 days straight this week. Then on Thursday morning, Bossman asked Avni if he was okay and he revealed to her that 2 nights prior he thought to "take pills and never wake up," but he didn't have enough pills to do anything. Bossman decided to take him to the ER following Iwona's earlier advice and she called his outpatient provider who recommended he go to the ER too. Per Bossman, Avni's last good day was a month ago when he went fishing and actually enjoyed himself "then his medications changed and he deteriorated." Collateral from the notes of this last visit at [facility] support that wife's view on Avni's depressed state. Phrases like "feels low, hopeless, struggling, isolating from others, no emotions, tired and detached" were documented.    With the patient's initial presentation and information from collateral, patient was differential included major depressive disorder vs persistent depressive disorder. He was initially continued on his home medications of cariprazine 1.5 mg po qd, fluoxetine 40 mg po qs, and quetiapine 100 mg po qHS. Then cariprazine was discontinued secondary to concerns of limited efficacy and polypharmacy, fluoxetine was tapered until discontinuation. The patient's quetiapine dose was not changed. Vortioxetine was started at 5 mg to target depression due to previous failed medication trials with other antidepressants. An ECT consult was done in the setting of patient's symptom severity on admission and possible treatment resistance. However, through the above medication adjustments and milieu therapy, patient improved on the unit and it was recommended not to pursue ECT.    Throughout his hospitalization, patient was pleasant and in very good behavioral control. He never became agitated, aggressive or violent. He participated in groups and remained active in the unit milieu. He was fully engaged in supportive therapy with acceptance & commitment therapy, emotion regulation/ coping skills teaching and psychoeducation with the psychologist. Patient was also able to tend to his ADLs independently and appropriately.     Per Bossman, this is the best she's seen her  in 15 years. Upon discharge, Patient appeared depressed on the initial interview with reports of anhedonia (no longer enjoying fishing,, hypersomnia, decreased appetite, decreased concentration, and feelings of guilt. He reported a fleeting thought of taking a handful of quetiapine with the intention of sleeping not to harm himself. These symptoms worsened in the last 6 weeks, but have been present for at least the last year in the context of acute and chronic psychosocial stressors. Patient did not express symptoms of mini or psychosis.    Per collateral (wife, Bossman), patient hadn't been himself for awhile but Bossman states that Avni was against being admitted last year (8/21), but he should have been admitted. He was unlike himself- very nervous and distant with angry outbursts. "He was severely depressed" after their dog stopped walking and his daughter cancelled her visit last minute. Bossman states that Avni has been feeling worthless and has "no feelings." She states that he "can't accept life or give it." They haven't slept in the same room for 2-3 years, he doesn't give kisses, and they have not been going out recently. She's had a "bad feeling" about him over the last 2 weeks and even his stepdaughter, Iwona, a mental health professional, told her to watch him more. Over the last week, Avni's presentation got worse where Avni didn't want to go out. He only wanted to sleep and if he went out he would come back and go to sleep. He also did not shower for 4 days straight this week. Per Bossman, Avni's last good day was a month ago when he went fishing and actually enjoyed himself "then his medications changed and he deteriorated."     With the patient's initial presentation and information from collateral, patient was differential included major depressive disorder vs persistent depressive disorder, but most likely MDD given the symptom severity. He was initially continued on his home medications of cariprazine 1.5 mg po qd, fluoxetine 40 mg po qs, and quetiapine 100 mg po qHS. Then cariprazine was discontinued secondary to concerns of limited efficacy and polypharmacy and fluoxetine was tapered until discontinuation. The patient's quetiapine dose was not changed. Vortioxetine was started at 5 mg to target depression due to previous failed medication trials with other antidepressants. An ECT consult was done in the setting of patient's symptom severity on admission and possible treatment resistance. However, through the above medication adjustments and milieu therapy, patient improved on the unit and it was recommended not to pursue ECT.    Throughout his hospitalization, patient was pleasant and in very good behavioral control. He never became agitated, aggressive or violent. He participated in groups and remained active in the unit milieu. He was fully engaged in supportive therapy with the psychologist. Patient was also able to tend to his ADLs independently and appropriately.     Upon discharge, patient appeared euthymic and future-oriented. He was looking forward to restarting work, visiting family, and resuming his favorite pastimes. He denied thoughts of harming himself or others. His wife stated that this is the best he's looked in 15 years. Patient was offered PHP and outpatient follow up through [facility], which he declined so he could go back to work and continue treatment with his current mental health providers. He was discharged with a 14-day supply of vortioxetine 10 mg qd and quetiapine 100 mg po qHS. Patient appeared depressed on the initial interview with reports of anhedonia (no longer enjoying fishing, hypersomnia, decreased appetite, decreased concentration, and feelings of guilt. He reported a fleeting thought of taking a handful of quetiapine with the intention of sleeping not to harm himself. These symptoms worsened in the last 6 weeks, but have been present for at least the last year in the context of acute and chronic psychosocial stressors. Patient did not express symptoms of mini or psychosis.    Per collateral (wife, Bossman), patient hadn't been himself for awhile but Bossman states that Avni was against being admitted last year (8/21), but he should have been admitted. He was unlike himself- very nervous and distant with angry outbursts. "He was severely depressed" after their dog stopped walking and his daughter cancelled her visit last minute. Bossman states that Avni has been feeling worthless and has "no feelings." She states that he "can't accept life or give it." They haven't slept in the same room for 2-3 years, he doesn't give kisses, and they have not been going out recently. She's had a "bad feeling" about him over the last 2 weeks and even his stepdaughter, Iwona, a mental health professional, told her to watch him more. Over the last week, Avni's presentation got worse where Avni didn't want to go out. He only wanted to sleep and if he went out he would come back and go to sleep. He also did not shower for 4 days straight this week. Per Bossman, Avni's last good day was a month ago when he went fishing and actually enjoyed himself "then his medications changed and he deteriorated."     With the patient's initial presentation and information from collateral, patient was differential included major depressive disorder vs persistent depressive disorder, but most likely MDD given the symptom severity. He was initially continued on his home medications of cariprazine 1.5 mg po qd, fluoxetine 40 mg po qs, and quetiapine 100 mg po qHS. Then cariprazine was discontinued secondary to concerns of limited efficacy and polypharmacy and fluoxetine was tapered until discontinuation. The patient's quetiapine dose was not changed. Vortioxetine was titrated to 10 mg to target depression due to previous failed medication trials with other antidepressants. An ECT consult was done in the setting of patient's symptom severity on admission and possible treatment resistance. However, through the above medication adjustments and milieu therapy, patient improved on the unit and it was recommended not to pursue ECT.    Throughout his hospitalization, patient was pleasant and in very good behavioral control. He never became agitated, aggressive or violent. He participated in groups and remained active in the unit milieu. He was fully engaged in supportive therapy with the psychologist. Patient was also able to tend to his ADLs independently and appropriately.     Upon discharge, patient appeared euthymic and future-oriented. He was looking forward to restarting work, visiting family, and resuming his favorite pastimes. He denied thoughts of harming himself or others. His wife stated that this is the best he's looked in 15 years. Patient was offered PHP, which he declined so he could go back to work and continue treatment with his current mental health providers. He was discharged with a 14-day supply of vortioxetine 10 mg qd and quetiapine 100 mg po qHS.

## 2022-07-20 NOTE — BH DISCHARGE NOTE NURSING/SOCIAL WORK/PSYCH REHAB - DISCHARGE INSTRUCTIONS AFTERCARE APPOINTMENTS
In order to check the location, date, or time of your aftercare appointment, please refer to your Discharge Instructions Document given to you upon leaving the hospital.  If you have lost the instructions please call [phone]

## 2022-07-20 NOTE — BH INPATIENT PSYCHIATRY PROGRESS NOTE - MSE UNSTRUCTURED FT
Appearance: Dressed appropriately.    Behavior: Cooperative. Fair eye contact.   Motor: No abnormal movements, no psychomotor slowing or activation.  Speech: normal rate, volume and tone.  Mood: "good."  Affect: neutral but depressed while discussing upcoming ECT treatment. constricted. stable. normal expressivity.   Thought Process: Linear  Associations: Fair.  Thought Content: Ruminations. SIIP, HIIP.  Perception:  No AVH.  Insight: Fair  Judgment: Fair  Attention: Fair.  Language: Fluent.  Gait: Intact.  Appearance: Dressed appropriately.    Behavior: Cooperative. Fair eye contact.   Motor: No abnormal movements, no psychomotor slowing or activation.  Speech: normal rate, volume and tone.  Mood: "good."  Affect: neutral. constricted. stable. normal expressivity.   Thought Process: Linear  Associations: Fair.  Thought Content: motivated to restart hobbies. No SIIP, HIIP.  Perception: No AVH.  Insight: Fair  Judgment: Fair  Attention: Fair.  Language: Fluent.  Gait: Intact.

## 2022-07-21 VITALS — TEMPERATURE: 97 F

## 2022-07-21 DIAGNOSIS — Z96.649 PRESENCE OF UNSPECIFIED ARTIFICIAL HIP JOINT: Chronic | ICD-10-CM

## 2022-07-21 PROCEDURE — 99231 SBSQ HOSP IP/OBS SF/LOW 25: CPT | Mod: GC

## 2022-07-21 RX ADMIN — VORTIOXETINE 10 MILLIGRAM(S): 5 TABLET, FILM COATED ORAL at 08:20

## 2022-07-21 NOTE — BH INPATIENT PSYCHIATRY PROGRESS NOTE - NSBHCHARTREVIEWVS_PSY_A_CORE FT
Vital Signs Last 24 Hrs  T(C): 36.3 (07-21-22 @ 08:22), Max: 36.3 (07-21-22 @ 08:22)  T(F): 97.4 (07-21-22 @ 08:22), Max: 97.4 (07-21-22 @ 08:22)  HR: --  BP: --  BP(mean): --  RR: --  SpO2: --    Orthostatic VS  07-21-22 @ 08:22  Lying BP: --/-- HR: --  Sitting BP: 132/69 HR: 83  Standing BP: --/-- HR: --  Site: --  Mode: --  Orthostatic VS  07-20-22 @ 07:52  Lying BP: --/-- HR: --  Sitting BP: 147/81 HR: 79  Standing BP: --/-- HR: --  Site: --  Mode: --

## 2022-07-21 NOTE — BH INPATIENT PSYCHIATRY PROGRESS NOTE - NSBHASSESSSUMMFT_PSY_ALL_CORE
62M, lives with wife, has adult children from prior marriage, adult stepchildren from current marriage, works in [employer] shop, with pphx of depression/anxiety, substance use (in remission), no prior hx of psychiatric admissions but does have hx of rehab in remote past and detox at [facility] in 2019, no prior suicide attempts, who presented to [facility] with worsening depression and fleeting thought to take more quetiapine than prescribed. Admitted for Major Depressive Disorder.     Today, patient is improved from admission appearing more hopeful, happy and without suicidal thoughts. With this clinical improvement, patient will be discharged today with vortioxetine 10 mg po qd and quetiapine 100 mg po qHS.     Plan:  1.	Legals: continue on 9.13 status  2.	Safety: routine observation, denies SI/HI/I/P on the unit. Haldol 5 mg/Ativan 2 mg/Benadryl 50 mg PO/IM PRN for agitation   3.	Psychiatric: Vortioxetine 10 mg qd, Quetiapine 100 mg qHS.  4.	Group, milieu, individual therapy as appropriate.  5.	Medical: no acute medical issues, no significant PMH. Admission labs WNL.  6.	Dispo: with clinical improvement, discharge to home. Outpatient follow-ups to be scheduled with therapist and psychiatrist.

## 2022-07-21 NOTE — BH INPATIENT PSYCHIATRY PROGRESS NOTE - NSBHFUPINTERVALCCFT_PSY_A_CORE
follow up for depression
follow up for depression
Seen for depression
follow up for depression
Seen for depression
follow up for depression

## 2022-07-21 NOTE — BH INPATIENT PSYCHIATRY PROGRESS NOTE - NSTXDCFAMGOALOTHER_PSY_ALL_CORE
Pt will discuss his thoughts / feelings related to his strained relationship with daughter.
Pt will discuss his thoughts / feelings related to his strained relationship with daughter.

## 2022-07-21 NOTE — BH INPATIENT PSYCHIATRY PROGRESS NOTE - NSDCCRITERIA_PSY_ALL_CORE
When pt is no longer an acute or imminent risk of harm to self or others, and is able to care for self safely, pt may then be discharged. 

## 2022-07-21 NOTE — BH INPATIENT PSYCHIATRY PROGRESS NOTE - CURRENT MEDICATION
MEDICATIONS  (STANDING):  QUEtiapine 100 milliGRAM(s) Oral at bedtime  vortioxetine 10 milliGRAM(s) Oral daily    MEDICATIONS  (PRN):  acetaminophen     Tablet .. 650 milliGRAM(s) Oral every 6 hours PRN Moderate Pain (4 - 6)  hydrOXYzine hydrochloride 50 milliGRAM(s) Oral every 6 hours PRN anxiety/agitation  LORazepam   Injectable 2 milliGRAM(s) IntraMuscular once PRN agitation  polyethylene glycol 3350 17 Gram(s) Oral daily PRN constipation

## 2022-07-21 NOTE — BH INPATIENT PSYCHIATRY PROGRESS NOTE - NSBHATTESTBILLINGAW_PSY_A_CORE
[id number]-Ooaobkcxru Inpatient care - moderate complexity - 25 minutes
[id number]-Yolfmivmrr Inpatient care - moderate complexity - 25 minutes
[id number]-Vgwqhszfig Inpatient care - moderate complexity - 25 minutes
[id number]-Aeavmaegoq Inpatient care - low complexity - 15 minutes
[id number]-Nyvozvnasy Inpatient care - moderate complexity - 25 minutes
[id number]-Zvptikbcui Inpatient care - moderate complexity - 25 minutes

## 2022-07-21 NOTE — PHARMACOTHERAPY INTERVENTION NOTE - COMMENTS
Discharge Education Note      Provided medication education upon discharge. Patient to be discharged on the following medications:     - Quetiapine 100 mG tablet, 1 tablet PO qHS   - Vortioxetine 10 mG tablet, 1 tablet PO daily    Discussed indication, dosing, and potential side effects. Patient provided the opportunity to ask questions. Patient expressed understanding to the above instruction. Patient provided supply of the above medications upon discharge.    Thank you for the opportunity to participate in the care of this patient.     Rosario Prajapati, PharmD, BCPP

## 2022-07-21 NOTE — BH INPATIENT PSYCHIATRY PROGRESS NOTE - NSTXDEPRESGOAL_PSY_ALL_CORE
Will identify 2 coping skills that assist in improving mood
Report using a coping skill to overcome sadness and worry in order to socialize with peers daily
Will identify 2 coping skills that assist in improving mood
Report using a coping skill to overcome sadness and worry in order to socialize with peers daily
Will identify 2 coping skills that assist in improving mood
Will identify 2 coping skills that assist in improving mood

## 2022-07-21 NOTE — BH INPATIENT PSYCHIATRY PROGRESS NOTE - PRN MEDS
MEDICATIONS  (PRN):  acetaminophen     Tablet .. 650 milliGRAM(s) Oral every 6 hours PRN Moderate Pain (4 - 6)  hydrOXYzine hydrochloride 50 milliGRAM(s) Oral every 6 hours PRN anxiety/agitation  LORazepam   Injectable 2 milliGRAM(s) IntraMuscular once PRN agitation  polyethylene glycol 3350 17 Gram(s) Oral daily PRN constipation  

## 2022-07-21 NOTE — BH INPATIENT PSYCHIATRY PROGRESS NOTE - MSE UNSTRUCTURED FT
Appearance: Dressed appropriately.    Behavior: Cooperative. Fair eye contact.   Motor: No abnormal movements, no psychomotor slowing or activation.  Speech: normal rate, volume and tone.  Mood: "excited."  Affect: happy - smiling when discussing future plans. full range. stable. normal expressivity.   Thought Process: Linear.  Associations: Fair.  Thought Content: describing future plans. No SIIP, HIIP.  Perception: No AVH.  Insight: Fair  Judgment: Fair  Attention: Fair.  Language: Fluent.  Gait: Intact.

## 2022-07-21 NOTE — BH INPATIENT PSYCHIATRY PROGRESS NOTE - NSBHATTESTTYPEVISIT_PSY_A_CORE
Attending Only
Attending Only
Attending with Resident/Fellow/Student

## 2022-07-21 NOTE — BH INPATIENT PSYCHIATRY PROGRESS NOTE - NSBHFUPINTERVALHXFT_PSY_A_CORE
Chart reviewed. Case d/w interdisciplinary team. Patient seen and examined. No acute overnight events, no PRNs required/requested. Patient is compliant with standing medications. Good sleep better. Reports fair appetite. States he's excited about discharge. He has plans to return to work and AA, see his grandson, and reach out to his daughter. Denies passive or active SI, intent, or plan. Denies AVH.

## 2022-07-21 NOTE — BH INPATIENT PSYCHIATRY PROGRESS NOTE - NSBHATTESTCOMMENTATTENDFT_PSY_A_CORE
Attenuated depression, pt is future oriented, without any SIIP or HIIP.      Risk assessment on discharge: Pt has chronic risk factors of depression/anxiety, prior admission, hx of ETOH use disorder, with acute risk factors of recent depression/SI, now treated with inpatient care.  Protective factors of supportive wife, has stepchildren and own children, employed, future oriented, therapeutic alliances, stable housing.  Pt has consistently denied suicidality and homicidality, is caring for ADLs independently.  Pt is moderate to high CHRONIC risk of harm, but is NO longer an acute or imminent risk of harm to self or others, can be discharged with outpatient follow up.    1. Will d/c home on current regimen.  2. Pt declined PHP.  Pt opting to return to outpatient NP and therapist.  Psychoeducation provided regarding importance of compliance with outpatient appointments and medications.  3. Pt was advised to remain abstinent with substances.  4. Pt was advised to dial 911 or return to ER if they become danger to self or others.  Attenuated depression, pt is future oriented, without any SIIP or HIIP.      Risk assessment on discharge: Pt has chronic risk factors of depression/anxiety, prior admission, hx of ETOH use disorder, with acute risk factors of recent depression, now treated with inpatient care.  Protective factors of supportive wife, has stepchildren and own children, employed, future oriented, therapeutic alliances, stable housing.  Pt has consistently denied suicidality and homicidality, is caring for ADLs independently.  Pt is moderate to high CHRONIC risk of harm, but is NO longer an acute or imminent risk of harm to self or others, can be discharged with outpatient follow up.    1. Will d/c home on current regimen.  2. Pt declined PHP.  Pt opting to return to outpatient NP and therapist.  Psychoeducation provided regarding importance of compliance with outpatient appointments and medications.  3. Pt was advised to remain abstinent with substances.  4. Pt was advised to dial 911 or return to ER if they become danger to self or others.

## 2022-07-21 NOTE — BH INPATIENT PSYCHIATRY PROGRESS NOTE - NSICDXBHPRIMARYDX_PSY_ALL_CORE
MDD (major depressive disorder)   F32.9  

## 2022-07-21 NOTE — BH INPATIENT PSYCHIATRY PROGRESS NOTE - NSTXDEPRESDATEEST_PSY_ALL_CORE
Spoke to pt's  (permission on file) - pt has been having a lot of nausea and vomiting with some diarrhea for the past 24 hours. Reports sore throat from the vomiting. No blood in emesis or stool. No fever. No known exposure to COVID. Per pt's , pt has had similar symptoms in the past. Pt is on dialysis. Advised urgent care evaluation today. Discussed that she cannot be seen in the clinic d/t her symptoms and COVID restrictions. Pt's  verbalized understanding. He requested a telephone call from Dr Heyden tomorrow, appt scheduled (pt does not have MyChart). Reiterated that pt should be seen today for evaluation. Pt's  verbalized understanding.   
15-Jul-2022
17-Jul-2022
15-Jul-2022
17-Jul-2022

## 2022-07-21 NOTE — BH INPATIENT PSYCHIATRY PROGRESS NOTE - NSBHMETABOLIC_PSY_ALL_CORE_FT
BMI: BMI (kg/m2): 28.7 (07-15-22 @ 12:39)  HbA1c:   Glucose:   BP: --  Lipid Panel: 
BMI: BMI (kg/m2): 28.7 (07-15-22 @ 12:39)  HbA1c:   Glucose:   BP: --  Lipid Panel: 
BMI: BMI (kg/m2): 28.7 (07-15-22 @ 12:39)  HbA1c:   Glucose:   BP: 125/55 (07-19-22 @ 07:59) (125/55 - 125/55)  Lipid Panel: 
BMI: BMI (kg/m2): 28.7 (07-15-22 @ 12:39)  HbA1c:   Glucose:   BP: 125/55 (07-19-22 @ 07:59) (125/55 - 125/55)  Lipid Panel: 
BMI: BMI (kg/m2): 28.7 (07-15-22 @ 12:39)  HbA1c:   Glucose:   BP: --  Lipid Panel: 
BMI: BMI (kg/m2): 28.7 (07-15-22 @ 12:39)  HbA1c:   Glucose:   BP: 125/55 (07-19-22 @ 07:59) (125/55 - 125/55)  Lipid Panel:

## 2022-10-31 NOTE — BH PATIENT PROFILE - BRADEN FRICTION AND SHEAR
Addended by: Camila Hardwick on: 10/31/2022 03:22 PM     Modules accepted: Orders
Addended byQuoc Yoder on: 10/31/2022 02:36 PM     Modules accepted: Orders
(3) no apparent problem

## 2022-12-27 NOTE — ED PROVIDER NOTE - RESPIRATORY, MLM
left lateral and right posterior column hemorrhoids excised Breath sounds clear and equal bilaterally.

## 2023-02-06 NOTE — ED ADULT NURSE NOTE - NSIMPLEMENTINTERV_GEN_ALL_ED
Constitutional: VS reviewed. Alert and orientedx3, pt appears uncomfortable   HEENT: Atraumatic, EOMI  CV: RRR  Lungs: Clear and equal bilaterally, no wheezes, rales or crackles  Abdomen: Soft, nondistended, diffuse TTP   MSK: No deformities  Skin: Warm and dry. As visualized no rashes, lesions, bruising or erythema  Neuro: Strength 5/5 in all extremities  Lymph: No pitting edema in extremities.
Implemented All Universal Safety Interventions:  [place] to call system. Call bell, personal items and telephone within reach. Instruct patient to call for assistance. Room bathroom lighting operational. Non-slip footwear when patient is off stretcher. Physically safe environment: no spills, clutter or unnecessary equipment. Stretcher in lowest position, wheels locked, appropriate side rails in place.

## 2024-01-26 NOTE — BH PATIENT PROFILE - NSSBIRTOFTENALCOHOL_GEN_A_CORE
----- Message from Jimena Barone MD sent at 1/26/2024  8:28 AM CST -----  Please let patient know that their bloodwork looks fine and does not require any change in treatment. Please see if they have any additional concerns.   Never

## 2024-09-19 NOTE — BH SAFETY PLAN - DISTRACTION PHONE 2
He was reevaluated by urology on 9/19/2024.  His BPH symptoms are well controlled on Flomax his postvoid residual is 151ml recent PSA 0.42. He needs reevaluation in 6 months with a repeat PSA and a repeat renal US prior to the visit.       [phone]
